# Patient Record
Sex: FEMALE | Race: OTHER | Employment: FULL TIME | URBAN - METROPOLITAN AREA
[De-identification: names, ages, dates, MRNs, and addresses within clinical notes are randomized per-mention and may not be internally consistent; named-entity substitution may affect disease eponyms.]

---

## 2022-09-28 ENCOUNTER — TELEPHONE (OUTPATIENT)
Dept: OTHER | Facility: OTHER | Age: 37
End: 2022-09-28

## 2022-09-28 NOTE — TELEPHONE ENCOUNTER
Patient is requesting a call back, She needs to reschedule her appointment for 10/18/22 to a sooner date since she is running low on her Cymbalta and her Remeron  She on has enough for about 7 days

## 2022-10-01 ENCOUNTER — HOSPITAL ENCOUNTER (EMERGENCY)
Facility: HOSPITAL | Age: 37
End: 2022-10-03
Attending: EMERGENCY MEDICINE | Admitting: EMERGENCY MEDICINE
Payer: COMMERCIAL

## 2022-10-01 ENCOUNTER — APPOINTMENT (EMERGENCY)
Dept: CT IMAGING | Facility: HOSPITAL | Age: 37
End: 2022-10-01
Payer: COMMERCIAL

## 2022-10-01 ENCOUNTER — APPOINTMENT (OUTPATIENT)
Dept: RADIOLOGY | Facility: HOSPITAL | Age: 37
End: 2022-10-01
Payer: COMMERCIAL

## 2022-10-01 DIAGNOSIS — R41.82 ALTERED MENTAL STATUS: ICD-10-CM

## 2022-10-01 DIAGNOSIS — F10.929 ALCOHOL INTOXICATION (HCC): ICD-10-CM

## 2022-10-01 DIAGNOSIS — R45.851 SUICIDAL IDEATION: Primary | ICD-10-CM

## 2022-10-01 LAB
ALBUMIN SERPL BCP-MCNC: 4.1 G/DL (ref 3.5–5)
ALP SERPL-CCNC: 53 U/L (ref 34–104)
ALT SERPL W P-5'-P-CCNC: 11 U/L (ref 7–52)
AMMONIA PLAS-SCNC: 29 UMOL/L (ref 18–72)
AMPHETAMINES SERPL QL SCN: NEGATIVE
ANION GAP SERPL CALCULATED.3IONS-SCNC: 11 MMOL/L (ref 4–13)
APAP SERPL-MCNC: <10 UG/ML (ref 10–20)
AST SERPL W P-5'-P-CCNC: 23 U/L (ref 13–39)
BARBITURATES UR QL: NEGATIVE
BASE EX.OXY STD BLDV CALC-SCNC: 96.4 % (ref 60–80)
BASE EXCESS BLDV CALC-SCNC: 1.4 MMOL/L
BASOPHILS # BLD AUTO: 0.06 THOUSANDS/ΜL (ref 0–0.1)
BASOPHILS NFR BLD AUTO: 1 % (ref 0–1)
BENZODIAZ UR QL: NEGATIVE
BILIRUB DIRECT SERPL-MCNC: 0.02 MG/DL (ref 0–0.2)
BILIRUB SERPL-MCNC: 0.21 MG/DL (ref 0.2–1)
BUN SERPL-MCNC: 12 MG/DL (ref 5–25)
CALCIUM SERPL-MCNC: 8.7 MG/DL (ref 8.4–10.2)
CHLORIDE SERPL-SCNC: 107 MMOL/L (ref 96–108)
CK SERPL-CCNC: 84 U/L (ref 26–192)
CO2 SERPL-SCNC: 29 MMOL/L (ref 21–32)
COCAINE UR QL: NEGATIVE
CREAT SERPL-MCNC: 0.62 MG/DL (ref 0.6–1.3)
EOSINOPHIL # BLD AUTO: 0.04 THOUSAND/ΜL (ref 0–0.61)
EOSINOPHIL NFR BLD AUTO: 1 % (ref 0–6)
ERYTHROCYTE [DISTWIDTH] IN BLOOD BY AUTOMATED COUNT: 13.2 % (ref 11.6–15.1)
ETHANOL EXG-MCNC: 0.09 MG/DL
ETHANOL EXG-MCNC: 0.13 MG/DL
ETHANOL EXG-MCNC: 0.17 MG/DL
ETHANOL EXG-MCNC: 0.21 MG/DL
ETHANOL SERPL-MCNC: 392 MG/DL
GFR SERPL CREATININE-BSD FRML MDRD: 117 ML/MIN/1.73SQ M
GLUCOSE SERPL-MCNC: 94 MG/DL (ref 65–140)
HCG SERPL QL: NEGATIVE
HCO3 BLDV-SCNC: 26.1 MMOL/L (ref 24–30)
HCT VFR BLD AUTO: 35.4 % (ref 34.8–46.1)
HGB BLD-MCNC: 12.3 G/DL (ref 11.5–15.4)
IMM GRANULOCYTES # BLD AUTO: 0.01 THOUSAND/UL (ref 0–0.2)
IMM GRANULOCYTES NFR BLD AUTO: 0 % (ref 0–2)
LYMPHOCYTES # BLD AUTO: 1.85 THOUSANDS/ΜL (ref 0.6–4.47)
LYMPHOCYTES NFR BLD AUTO: 33 % (ref 14–44)
MCH RBC QN AUTO: 32.5 PG (ref 26.8–34.3)
MCHC RBC AUTO-ENTMCNC: 34.7 G/DL (ref 31.4–37.4)
MCV RBC AUTO: 94 FL (ref 82–98)
METHADONE UR QL: NEGATIVE
MONOCYTES # BLD AUTO: 0.33 THOUSAND/ΜL (ref 0.17–1.22)
MONOCYTES NFR BLD AUTO: 6 % (ref 4–12)
NEUTROPHILS # BLD AUTO: 3.27 THOUSANDS/ΜL (ref 1.85–7.62)
NEUTS SEG NFR BLD AUTO: 59 % (ref 43–75)
NRBC BLD AUTO-RTO: 0 /100 WBCS
O2 CT BLDV-SCNC: 17.6 ML/DL
OPIATES UR QL SCN: NEGATIVE
OXYCODONE+OXYMORPHONE UR QL SCN: NEGATIVE
PCO2 BLDV: 41.6 MM HG (ref 42–50)
PCP UR QL: NEGATIVE
PH BLDV: 7.42 [PH] (ref 7.3–7.4)
PLATELET # BLD AUTO: 244 THOUSANDS/UL (ref 149–390)
PMV BLD AUTO: 8.7 FL (ref 8.9–12.7)
PO2 BLDV: 146.8 MM HG (ref 35–45)
POTASSIUM SERPL-SCNC: 4.4 MMOL/L (ref 3.5–5.3)
PROT SERPL-MCNC: 6.9 G/DL (ref 6.4–8.4)
RBC # BLD AUTO: 3.78 MILLION/UL (ref 3.81–5.12)
SALICYLATES SERPL-MCNC: <5 MG/DL (ref 3–20)
SODIUM SERPL-SCNC: 147 MMOL/L (ref 135–147)
THC UR QL: NEGATIVE
WBC # BLD AUTO: 5.56 THOUSAND/UL (ref 4.31–10.16)

## 2022-10-01 PROCEDURE — 85025 COMPLETE CBC W/AUTO DIFF WBC: CPT | Performed by: EMERGENCY MEDICINE

## 2022-10-01 PROCEDURE — 80076 HEPATIC FUNCTION PANEL: CPT | Performed by: EMERGENCY MEDICINE

## 2022-10-01 PROCEDURE — 71045 X-RAY EXAM CHEST 1 VIEW: CPT

## 2022-10-01 PROCEDURE — 82550 ASSAY OF CK (CPK): CPT | Performed by: EMERGENCY MEDICINE

## 2022-10-01 PROCEDURE — 70450 CT HEAD/BRAIN W/O DYE: CPT

## 2022-10-01 PROCEDURE — 80143 DRUG ASSAY ACETAMINOPHEN: CPT | Performed by: EMERGENCY MEDICINE

## 2022-10-01 PROCEDURE — 80179 DRUG ASSAY SALICYLATE: CPT | Performed by: EMERGENCY MEDICINE

## 2022-10-01 PROCEDURE — 36415 COLL VENOUS BLD VENIPUNCTURE: CPT | Performed by: EMERGENCY MEDICINE

## 2022-10-01 PROCEDURE — 99285 EMERGENCY DEPT VISIT HI MDM: CPT | Performed by: EMERGENCY MEDICINE

## 2022-10-01 PROCEDURE — 84703 CHORIONIC GONADOTROPIN ASSAY: CPT | Performed by: EMERGENCY MEDICINE

## 2022-10-01 PROCEDURE — 82140 ASSAY OF AMMONIA: CPT | Performed by: EMERGENCY MEDICINE

## 2022-10-01 PROCEDURE — 80048 BASIC METABOLIC PNL TOTAL CA: CPT | Performed by: EMERGENCY MEDICINE

## 2022-10-01 PROCEDURE — 82075 ASSAY OF BREATH ETHANOL: CPT | Performed by: EMERGENCY MEDICINE

## 2022-10-01 PROCEDURE — 93005 ELECTROCARDIOGRAM TRACING: CPT

## 2022-10-01 PROCEDURE — 99285 EMERGENCY DEPT VISIT HI MDM: CPT

## 2022-10-01 PROCEDURE — 87635 SARS-COV-2 COVID-19 AMP PRB: CPT | Performed by: EMERGENCY MEDICINE

## 2022-10-01 PROCEDURE — 82805 BLOOD GASES W/O2 SATURATION: CPT | Performed by: EMERGENCY MEDICINE

## 2022-10-01 PROCEDURE — 82077 ASSAY SPEC XCP UR&BREATH IA: CPT | Performed by: EMERGENCY MEDICINE

## 2022-10-01 PROCEDURE — 80307 DRUG TEST PRSMV CHEM ANLYZR: CPT | Performed by: EMERGENCY MEDICINE

## 2022-10-01 RX ORDER — MIRTAZAPINE 30 MG/1
30 TABLET, FILM COATED ORAL
COMMUNITY
End: 2022-10-07

## 2022-10-01 RX ORDER — DULOXETIN HYDROCHLORIDE 60 MG/1
60 CAPSULE, DELAYED RELEASE ORAL DAILY
COMMUNITY
End: 2022-10-07

## 2022-10-01 RX ORDER — MIRTAZAPINE 15 MG/1
30 TABLET, FILM COATED ORAL ONCE
Status: COMPLETED | OUTPATIENT
Start: 2022-10-01 | End: 2022-10-01

## 2022-10-01 RX ORDER — NICOTINE 21 MG/24HR
14 PATCH, TRANSDERMAL 24 HOURS TRANSDERMAL ONCE
Status: COMPLETED | OUTPATIENT
Start: 2022-10-01 | End: 2022-10-02

## 2022-10-01 RX ORDER — NALOXONE HYDROCHLORIDE 1 MG/ML
1 INJECTION PARENTERAL ONCE
Status: COMPLETED | OUTPATIENT
Start: 2022-10-01 | End: 2022-10-01

## 2022-10-01 RX ADMIN — MIRTAZAPINE 30 MG: 15 TABLET, FILM COATED ORAL at 21:37

## 2022-10-01 RX ADMIN — NICOTINE 14 MG: 14 PATCH, EXTENDED RELEASE TRANSDERMAL at 21:38

## 2022-10-01 NOTE — ED PROVIDER NOTES
History  Chief Complaint   Patient presents with   • Altered Mental Status     Pt presents to ED via EMS from hotel where pt was found unresponsive on lobby on couch  Pt responses to sternal rub  81-year-old female previous history of alcohol abuse, boderline personality disorder, self harm, PTSD, hypokalemia presents for altered mental status    Patient found dressed nicely, passed out on a hotel couch  Patient initially speaking English to EMS  Arouse to sternal rub only  Following commands when awake  Was given Narcan without relief of symptoms  In emergency department patient denies that she speaks [place]  Patient follows commands when I ask her to do things in [place] but otherwise speaks [place]  And tells me that she does not speak [place]  Unable to find a osbaldo  on our [facility] 139  Review of records reveals the patient spoke English on multiple other visits to multiple other hospitals  Altered Mental Status  Presenting symptoms: disorientation and lethargy    Severity:  Severe  Most recent episode: Today  Episode history:  Single  Timing:  Constant  Progression:  Unchanged  Chronicity:  New  Context: alcohol use        Prior to Admission Medications   Prescriptions Last Dose Informant Patient Reported? Taking? DULoxetine (CYMBALTA) 60 mg delayed release capsule   Yes Yes   Sig: Take 60 mg by mouth daily   mirtazapine (REMERON) 30 mg tablet   Yes Yes   Sig: Take 30 mg by mouth daily at bedtime      Facility-Administered Medications: None       Past Medical History:   Diagnosis Date   • Alcohol abuse    • Anxiety    • Borderline personality disorder ([facility] [address] )    • Depression    • Eating disorder    • PTSD (post-traumatic stress disorder)    • Sleep difficulties        No past surgical history on file  No family history on file  I have reviewed and agree with the history as documented      E-Cigarette/Vaping   • E-Cigarette Use Current Every Day User      E-Cigarette/Vaping Substances     Social History     Vaping Use   • Vaping Use: Every day   Substance Use Topics   • Alcohol use: Yes     Comment: Patient stated social drinking   • Drug use: Never       Review of Systems   Unable to perform ROS: Acuity of condition       Physical Exam  Physical Exam  Vitals and nursing note reviewed  Constitutional:       Appearance: She is ill-appearing  She is not toxic-appearing or diaphoretic  HENT:      Head: Normocephalic and atraumatic  Mouth/Throat:      Mouth: Mucous membranes are moist    Eyes:      General: No scleral icterus  Extraocular Movements: Extraocular movements intact  Right eye: Normal extraocular motion  Left eye: Normal extraocular motion  Pupils: Pupils are equal    Cardiovascular:      Rate and Rhythm: Normal rate  Heart sounds: No murmur heard  Pulmonary:      Effort: No respiratory distress  Breath sounds: No wheezing  Comments: Productive cough    Abdominal:      General: There is no distension  Palpations: There is no mass  Musculoskeletal:         General: Normal range of motion  Cervical back: Neck supple  Skin:     Capillary Refill: Capillary refill takes less than 2 seconds  Coloration: Skin is not cyanotic  Findings: No erythema  Neurological:      GCS: GCS eye subscore is 2  GCS verbal subscore is 2  GCS motor subscore is 6  Cranial Nerves: No cranial nerve deficit or facial asymmetry  Motor: No weakness  Coordination: Coordination normal       Comments: Arouses to sternal rub  Week for a few seconds after sternal rub  Obeys commands regards to motor  Speaking foreign language vs tongues   Follows commands given in [address] Signs  ED Triage Vitals   Temperature Pulse Respirations Blood Pressure SpO2   10/01/22 1154 10/01/22 1152 10/01/22 1152 10/01/22 1152 10/01/22 1152   98 9 °F (37 2 °C) 84 14 111/78 100 %      Temp Source Heart Rate Source Patient Position - Orthostatic VS BP Location FiO2 (%)   10/01/22 1154 10/01/22 1433 10/01/22 1433 10/01/22 1433 --   Oral Monitor Lying Left arm       Pain Score       --                  Vitals:    10/01/22 1439 10/01/22 1557 10/01/22 2051 10/02/22 0605   BP: 114/88 117/85 119/67 107/70   Pulse: 68 68 69 69   Patient Position - Orthostatic VS:  Sitting Sitting Lying         Visual Acuity      ED Medications  Medications   nicotine (NICODERM CQ) 14 mg/24hr TD 24 hr patch 14 mg (14 mg Transdermal Medication Applied 10/1/22 2138)   naloxone (FOR EMS ONLY) [facility]) 2 MG/2ML injection 2 mg (0 mg Does not apply Given to EMS 10/1/22 1257)   mirtazapine (REMERON) tablet 30 mg (30 mg Oral Given 10/1/22 2137)   DULoxetine (CYMBALTA) delayed release capsule 90 mg (90 mg Oral Given 10/2/22 0804)       Diagnostic Studies  Results Reviewed     Procedure Component Value Units Date/Time    COVID only [[id number]]  (Normal) Collected: 10/01/22 2313    Lab Status: Final result Specimen: Nares from Nose Updated: 10/02/22 0023     SARS-CoV-2 Negative    Narrative:      FOR PEDIATRIC PATIENTS - copy/paste COVID Guidelines URL to browser: [email or web address]    SARS-CoV-2 assay is a Nucleic Acid Amplification assay intended for the  qualitative detection of nucleic acid from SARS-CoV-2 in nasopharyngeal  swabs  Results are for the presumptive identification of SARS-CoV-2 RNA  Positive results are indicative of infection with SARS-CoV-2, the virus  causing COVID-19, but do not rule out bacterial infection or co-infection  with other viruses  Laboratories within the [place] and its  territories are required to report all positive results to the appropriate  public health authorities  Negative results do not preclude SARS-CoV-2  infection and should not be used as the sole basis for treatment or other  patient management decisions   Negative results must be combined with  clinical observations, patient history, and epidemiological information  This test has not been FDA cleared or approved  This test has been authorized by FDA under an Emergency Use Authorization  (EUA)  This test is only authorized for the duration of time the  declaration that circumstances exist justifying the authorization of the  emergency use of an in vitro diagnostic tests for detection of SARS-CoV-2  virus and/or diagnosis of COVID-19 infection under section 564(b)(1) of  the Act, 21 U  S C  877WZT-1(O)(5), unless the authorization is terminated  or revoked sooner  The test has been validated but independent review by FDA  and CLIA is pending  Test performed using "[employer] GeneXpert: This RT-PCR assay targets N2,  a region unique to SARS-CoV-2  A conserved region in the E-gene was chosen  for pan-Sarbecovirus detection which includes SARS-CoV-2  According to CMS-2020-01-R, this platform meets the definition of high-throughput technology  POCT alcohol breath test [[id number]]  (Normal) Resulted: 10/01/22 2217    Lab Status: Final result Updated: 10/01/22 2217     EXTBreath Alcohol 0 087    POCT alcohol breath test [[id number]]  (Normal) Resulted: 10/01/22 2001    Lab Status: Final result Updated: 10/01/22 2001     EXTBreath Alcohol 0 126    POCT alcohol breath test [[id number]]  (Normal) Resulted: 10/01/22 1824    Lab Status: Final result Updated: 10/01/22 1825     EXTBreath Alcohol 0 167    Rapid drug screen, urine [[id number]]  (Normal) Collected: 10/01/22 1558    Lab Status: Final result Specimen: Urine, Clean Catch Updated: 10/01/22 1704     Amph/Meth UR Negative     Barbiturate Ur Negative     Benzodiazepine Urine Negative     Cocaine Urine Negative     Methadone Urine Negative     Opiate Urine Negative     PCP Ur Negative     THC Urine Negative     Oxycodone Urine Negative    Narrative:      FOR MEDICAL PURPOSES ONLY  IF CONFIRMATION NEEDED PLEASE CONTACT THE LAB WITHIN 5 DAYS      Drug Screen Cutoff Levels:  AMPHETAMINE/METHAMPHETAMINES  1000 ng/mL  BARBITURATES     200 ng/mL  BENZODIAZEPINES     200 ng/mL  COCAINE      300 ng/mL  METHADONE      300 ng/mL  OPIATES      300 ng/mL  PHENCYCLIDINE     25 ng/mL  THC       50 ng/mL  OXYCODONE      100 ng/mL    POCT alcohol breath test [[id number]]  (Abnormal) Resulted: 10/01/22 1653    Lab Status: Edited Result - FINAL Updated: 10/01/22 1654     EXTBreath Alcohol 0 207    hCG, qualitative pregnancy [[id number]]  (Normal) Collected: 10/01/22 1204    Lab Status: Final result Specimen: Blood from Arm, Left Updated: 10/01/22 1312     Preg, Serum Negative    Hepatic function panel [[id number]]  (Normal) Collected: 10/01/22 1204    Lab Status: Final result Specimen: Blood from Arm, Left Updated: 10/01/22 1255     Total Bilirubin 0 21 mg/dL      Bilirubin, Direct 0 02 mg/dL      Alkaline Phosphatase 53 U/L      AST 23 U/L      ALT 11 U/L      Total Protein 6 9 g/dL      Albumin 4 1 g/dL     CK (with reflex to MB) [[id number]]  (Normal) Collected: 10/01/22 1204    Lab Status: Final result Specimen: Blood from Arm, Left Updated: 10/01/22 1255     Total CK 84 U/L     Basic metabolic panel [[id number]] Collected: 10/01/22 1204    Lab Status: Final result Specimen: Blood from Arm, Left Updated: 10/01/22 1255     Sodium 147 mmol/L      Potassium 4 4 mmol/L      Chloride 107 mmol/L      CO2 29 mmol/L      ANION GAP 11 mmol/L      BUN 12 mg/dL      Creatinine 0 62 mg/dL      Glucose 94 mg/dL      Calcium 8 7 mg/dL      eGFR 117 ml/min/1 73sq m     Narrative:      [facility] guidelines for Chronic Kidney Disease (CKD):   •  Stage 1 with normal or high GFR (GFR > 90 mL/min/1 73 square meters)  •  Stage 2 Mild CKD (GFR = 60-89 mL/min/1 73 square meters)  •  Stage 3A Moderate CKD (GFR = 45-59 mL/min/1 73 square meters)  •  Stage 3B Moderate CKD (GFR = 30-44 mL/min/1 73 square meters)  •  Stage 4 Severe CKD (GFR = 15-29 mL/min/1 73 square meters)  •  Stage 5 End Stage CKD (GFR <15 mL/min/1 73 square meters)  Note: GFR calculation is accurate only with a steady state creatinine    Salicylate level [[id number]]  (Normal) Collected: 10/01/22 1204    Lab Status: Final result Specimen: Blood from Arm, Left Updated: 31/45/15 1299     Salicylate Lvl <5 mg/dL     Acetaminophen level-If concentration is detectable, please discuss with medical  on call   [[id number]]  (Abnormal) Collected: 10/01/22 1204    Lab Status: Final result Specimen: Blood from Arm, Left Updated: 10/01/22 1251     Acetaminophen Level <10 ug/mL     Ethanol [[id number]]  (Abnormal) Collected: 10/01/22 1204    Lab Status: Final result Specimen: Blood from Arm, Left Updated: 10/01/22 1250     Ethanol Lvl 392 mg/dL     Ammonia [[id number]]  (Normal) Collected: 10/01/22 1204    Lab Status: Final result Specimen: Blood from Arm, Left Updated: 10/01/22 1250     Ammonia 29 umol/L     Blood gas, venous [[id number]]  (Abnormal) Collected: 10/01/22 1204    Lab Status: Final result Specimen: Blood from Arm, Left Updated: 10/01/22 1239     pH, Yao 7 416     pCO2, Yao 41 6 mm Hg      pO2, Yao 146 8 mm Hg      HCO3, Yao 26 1 mmol/L      Base Excess, Yao 1 4 mmol/L      O2 Content, Yao 17 6 ml/dL      O2 HGB, VENOUS 96 4 %     CBC and differential [[id number]]  (Abnormal) Collected: 10/01/22 1204    Lab Status: Final result Specimen: Blood from Arm, Left Updated: 10/01/22 1216     WBC 5 56 Thousand/uL      RBC 3 78 Million/uL      Hemoglobin 12 3 g/dL      Hematocrit 35 4 %      MCV 94 fL      MCH 32 5 pg      MCHC 34 7 g/dL      RDW 13 2 %      MPV 8 7 fL      Platelets 299 Thousands/uL      nRBC 0 /100 WBCs      Neutrophils Relative 59 %      Immat GRANS % 0 %      Lymphocytes Relative 33 %      Monocytes Relative 6 %      Eosinophils Relative 1 %      Basophils Relative 1 %      Neutrophils Absolute 3 27 Thousands/µL      Immature Grans Absolute 0 01 Thousand/uL      Lymphocytes Absolute 1 85 Thousands/µL      Monocytes Absolute 0 33 Thousand/µL      Eosinophils Absolute 0 04 Thousand/µL      Basophils Absolute 0 06 Thousands/µL                  XR chest 1 view portable   Final Result by Niecy Santamaria MD (10/01 1338)      No acute cardiopulmonary disease  Workstation performed: [id number]         CT head without contrast   Final Result by Margaret Torre MD (10/01 1314)      No acute intracranial abnormality  Workstation performed: [id number]                    Procedures  Procedures         ED Course  ED Course as of 10/02/22 0824   Sat Oct 01, 2022   1319 Procedure Note: EKG  Date/Time: 10/01/22 1:19 PM   Interpreted by: Payton Hashimoto  Indications / Diagnosis: AMS  ECG reviewed by me, the ED Provider: yes   The EKG demonstrates:  Rhythm: normal sinus  Intervals: normal intervals  Axis: normal axis  QRS/Blocks: normal QRS  ST Changes: No acute ST Changes, no STD/DEEPA      1401 Patient is now speaking [place]  Requesting to remove the IV     1509 MEDICAL ALCOHOL(!): 392   1614 Patient is ambulating without problem   1640 Patient continues to speak english normally  Has ate a full meal  Drinking soda  1728 EXTBreath Alcohol: 0 207  Should be sober by 10pm   1740 Patient not interested in ETOH rehab    1852 Pt now asking to see crisis  Will have crisis eval once sober  Kyle Murciahazel Oct 02, 2022   0262 Suicidal 201  Bed Search                               SBIRT 22yo+    Flowsheet Row Most Recent Value   SBIRT (23 yo +)    In order to provide better care to our patients, we are screening all of our patients for alcohol and drug use  Would it be okay to ask you these screening questions? No Filed at: 10/01/2022 2244                    MDM  Number of Diagnoses or Management Options  Alcohol intoxication ([facility] [address] ): new and requires workup  Altered mental status: new and requires workup  Suicidal ideation: new and requires workup  Diagnosis management comments: Patient presents with altered mental status    GCS 10  Wakes to sternal rub  No reason for intubation at this point  Patient given naloxone without good effect  Will evaluate for intracranial bleed, intoxicants, hyper am anemia, hypercapnia  Workup positive for high ethanol level  Otherwise no significant findings  Patient had resolution of her symptoms and started speaking [place]  Will await sobriety versus patient getting a ride  Patient declined admission to alcohol detox  Patient request evaluation by crisis worker  Patient signed out to evening doctor pending crisis evaluation  Amount and/or Complexity of Data Reviewed  Clinical lab tests: ordered and reviewed  Tests in the radiology section of CPT®: ordered and reviewed  Review and summarize past medical records: yes  Independent visualization of images, tracings, or specimens: yes    Risk of Complications, Morbidity, and/or Mortality  Presenting problems: moderate  Diagnostic procedures: moderate  Management options: moderate        Disposition  Final diagnoses:   Alcohol intoxication ([facility] )   Altered mental status   Suicidal ideation     Time reflects when diagnosis was documented in both MDM as applicable and the Disposition within this note     Time User Action Codes Description Comment    10/1/2022  4:44 PM Caridad Montiel [F10 929] Alcohol intoxication ([facility] )     10/1/2022  4:44 PM Falguni James Add [R41 82] Altered mental status     10/1/2022 11:04 PM Levester Mimes Add [R45 851] Suicidal ideation     10/1/2022 11:04 PM Levester Mimes Modify [F10 929] [facility] ([facility] )     10/1/2022 11:04 PM Levester Mimes Modify [A16 454] Suicidal ideation       ED Disposition     ED Disposition   Transfer to [address] [place] [address]   --    Date/Time   Sat Oct 1, 2022 11:05 PM    Comment   Loramaddie Yamel Neto Fried should be transferred to behavioral health, and has been medically cleared             MD Jessica Bell MD Follow-up Information     Follow up With Specialties Details Why Contact Info Additional Information    Your primary doctor  Call in 1 day       [place] Call  As needed [address] [address]  [address] , [place], [place], [place], [id number] [phone]          Patient's Medications   Discharge Prescriptions    No medications on file       No discharge procedures on file      [address] Review     None          ED Provider  Electronically Signed by           Papito Buenrostro DO  10/02/22 4939

## 2022-10-01 NOTE — ED NOTES
Pt " I would like some more soda and I want to 302 myself" Pt was asked if they know the difference between 302 and 201  Pt "I know the difference that is why I want to be 302 " Pt educated on the differenced and informed that they could sign themselves in as a 201 after speaking with Crisis  Asked pt if they are having an thoughts of hurting or killing themselves, pt "Like, not right now " Pt denies auditory or visual hallucinations  Pt encouraged to increase fluids and will be tested again to make sure pt is below the legal limit prior to speaking with Crisis  Attending aware         Leanna Ram RN  10/01/22 8266

## 2022-10-01 NOTE — ED NOTES
Male visitor at the bedside speaking with the pt at this time   Pt overheard speaking Aramis Ramírez RN  10/01/22 3563

## 2022-10-01 NOTE — ED NOTES
Pt ambulating to the bathroom without issues or complications      Mikayla Albrecht, RN  10/01/22 1073

## 2022-10-01 NOTE — ED NOTES
Pt requesting to have another breathalyzer  PT still not able to speak with Crisis  Pt verbalizing frustration        Sharyle Beans, RN  10/01/22 3147

## 2022-10-02 VITALS
DIASTOLIC BLOOD PRESSURE: 77 MMHG | HEART RATE: 53 BPM | TEMPERATURE: 97.5 F | OXYGEN SATURATION: 100 % | RESPIRATION RATE: 15 BRPM | SYSTOLIC BLOOD PRESSURE: 112 MMHG | WEIGHT: 103.4 LBS

## 2022-10-02 LAB
SARS-COV-2 RNA RESP QL NAA+PROBE: NEGATIVE
SARS-COV-2 RNA RESP QL NAA+PROBE: NEGATIVE

## 2022-10-02 PROCEDURE — 99242 OFF/OP CONSLTJ NEW/EST SF 20: CPT | Performed by: PSYCHIATRY & NEUROLOGY

## 2022-10-02 PROCEDURE — 87635 SARS-COV-2 COVID-19 AMP PRB: CPT | Performed by: EMERGENCY MEDICINE

## 2022-10-02 RX ORDER — NICOTINE 21 MG/24HR
14 PATCH, TRANSDERMAL 24 HOURS TRANSDERMAL DAILY
Status: DISCONTINUED | OUTPATIENT
Start: 2022-10-03 | End: 2022-10-03 | Stop reason: HOSPADM

## 2022-10-02 RX ORDER — HALOPERIDOL 5 MG/ML
5 INJECTION INTRAMUSCULAR
Status: CANCELLED | OUTPATIENT
Start: 2022-10-02

## 2022-10-02 RX ORDER — MIRTAZAPINE 15 MG/1
30 TABLET, FILM COATED ORAL ONCE
Status: CANCELLED | OUTPATIENT
Start: 2022-10-02 | End: 2022-10-02

## 2022-10-02 RX ORDER — MIRTAZAPINE 15 MG/1
30 TABLET, FILM COATED ORAL
Status: DISCONTINUED | OUTPATIENT
Start: 2022-10-02 | End: 2022-10-03 | Stop reason: HOSPADM

## 2022-10-02 RX ORDER — LANOLIN ALCOHOL/MO/W.PET/CERES
3 CREAM (GRAM) TOPICAL ONCE
Status: COMPLETED | OUTPATIENT
Start: 2022-10-02 | End: 2022-10-02

## 2022-10-02 RX ORDER — LANOLIN ALCOHOL/MO/W.PET/CERES
100 CREAM (GRAM) TOPICAL DAILY
Status: CANCELLED | OUTPATIENT
Start: 2022-10-02

## 2022-10-02 RX ORDER — MAGNESIUM HYDROXIDE/ALUMINUM HYDROXICE/SIMETHICONE 120; 1200; 1200 MG/30ML; MG/30ML; MG/30ML
30 SUSPENSION ORAL EVERY 4 HOURS PRN
Status: CANCELLED | OUTPATIENT
Start: 2022-10-02

## 2022-10-02 RX ORDER — HYDROXYZINE HYDROCHLORIDE 25 MG/1
25 TABLET, FILM COATED ORAL
Status: CANCELLED | OUTPATIENT
Start: 2022-10-02

## 2022-10-02 RX ORDER — POLYETHYLENE GLYCOL 3350 17 G/17G
17 POWDER, FOR SOLUTION ORAL DAILY PRN
Status: CANCELLED | OUTPATIENT
Start: 2022-10-02

## 2022-10-02 RX ORDER — FOLIC ACID 1 MG/1
1 TABLET ORAL DAILY
Status: CANCELLED | OUTPATIENT
Start: 2022-10-02

## 2022-10-02 RX ORDER — HALOPERIDOL 5 MG/1
5 TABLET ORAL
Status: CANCELLED | OUTPATIENT
Start: 2022-10-02

## 2022-10-02 RX ORDER — NICOTINE 21 MG/24HR
14 PATCH, TRANSDERMAL 24 HOURS TRANSDERMAL ONCE
Status: DISCONTINUED | OUTPATIENT
Start: 2022-10-02 | End: 2022-10-03 | Stop reason: HOSPADM

## 2022-10-02 RX ORDER — BENZTROPINE MESYLATE 1 MG/ML
0.5 INJECTION INTRAMUSCULAR; INTRAVENOUS
Status: CANCELLED | OUTPATIENT
Start: 2022-10-02

## 2022-10-02 RX ORDER — HALOPERIDOL 1 MG/1
2 TABLET ORAL
Status: CANCELLED | OUTPATIENT
Start: 2022-10-02

## 2022-10-02 RX ORDER — GABAPENTIN 100 MG/1
100 CAPSULE ORAL 3 TIMES DAILY
Status: CANCELLED | OUTPATIENT
Start: 2022-10-02

## 2022-10-02 RX ORDER — ACETAMINOPHEN 325 MG/1
975 TABLET ORAL EVERY 6 HOURS PRN
Status: CANCELLED | OUTPATIENT
Start: 2022-10-02

## 2022-10-02 RX ORDER — ACETAMINOPHEN 325 MG/1
650 TABLET ORAL EVERY 4 HOURS PRN
Status: CANCELLED | OUTPATIENT
Start: 2022-10-02

## 2022-10-02 RX ORDER — BENZTROPINE MESYLATE 1 MG/1
1 TABLET ORAL EVERY 6 HOURS PRN
Status: CANCELLED | OUTPATIENT
Start: 2022-10-02

## 2022-10-02 RX ORDER — ACETAMINOPHEN 325 MG/1
650 TABLET ORAL EVERY 6 HOURS PRN
Status: CANCELLED | OUTPATIENT
Start: 2022-10-02

## 2022-10-02 RX ORDER — DULOXETIN HYDROCHLORIDE 60 MG/1
60 CAPSULE, DELAYED RELEASE ORAL ONCE
Status: CANCELLED | OUTPATIENT
Start: 2022-10-03

## 2022-10-02 RX ORDER — HALOPERIDOL 5 MG/ML
2.5 INJECTION INTRAMUSCULAR
Status: CANCELLED | OUTPATIENT
Start: 2022-10-02

## 2022-10-02 RX ORDER — LORAZEPAM 2 MG/ML
2 INJECTION INTRAMUSCULAR
Status: CANCELLED | OUTPATIENT
Start: 2022-10-02

## 2022-10-02 RX ORDER — TRAZODONE HYDROCHLORIDE 100 MG/1
100 TABLET ORAL
Status: CANCELLED | OUTPATIENT
Start: 2022-10-02

## 2022-10-02 RX ORDER — BENZTROPINE MESYLATE 1 MG/ML
1 INJECTION INTRAMUSCULAR; INTRAVENOUS
Status: CANCELLED | OUTPATIENT
Start: 2022-10-02

## 2022-10-02 RX ORDER — LORAZEPAM 1 MG/1
1 TABLET ORAL
Status: CANCELLED | OUTPATIENT
Start: 2022-10-02

## 2022-10-02 RX ORDER — LORAZEPAM 2 MG/ML
1 INJECTION INTRAMUSCULAR
Status: CANCELLED | OUTPATIENT
Start: 2022-10-02

## 2022-10-02 RX ORDER — HYDROXYZINE HYDROCHLORIDE 25 MG/1
50 TABLET, FILM COATED ORAL
Status: CANCELLED | OUTPATIENT
Start: 2022-10-02

## 2022-10-02 RX ORDER — LORAZEPAM 2 MG/ML
1 INJECTION INTRAMUSCULAR EVERY 4 HOURS PRN
Status: CANCELLED | OUTPATIENT
Start: 2022-10-02

## 2022-10-02 RX ADMIN — MIRTAZAPINE 30 MG: 15 TABLET, FILM COATED ORAL at 20:39

## 2022-10-02 RX ADMIN — DULOXETINE HYDROCHLORIDE 90 MG: 60 CAPSULE, DELAYED RELEASE ORAL at 08:04

## 2022-10-02 RX ADMIN — NICOTINE 14 MG: 14 PATCH, EXTENDED RELEASE TRANSDERMAL at 18:11

## 2022-10-02 RX ADMIN — Medication 3 MG: at 20:39

## 2022-10-02 NOTE — ED NOTES
Intake is advising COVID re-test no later than 3 hours prior to scheduled pick-up so as not to delay transfer  Please order / collect COVID re-test no later than 0700 to allow for 1000 transfer on 10/3/22

## 2022-10-02 NOTE — ED NOTES
Patient is accepted at [facility]  Patient is accepted by Arabella Toscano on behalf of Dr Faustino So per [facility]  Transportation is arranged with [employer]  Transportation is scheduled for 1000 on Monday 10/3/22 (per Ivelisse Rollins, there is no transport available today)  Patient may go to the floor anytime before 1430 or after 1530, excluding arrival between 1830 and 1930  Intake is aware  EMTALA initiated  To be printed and signed by patient and ED physician prior to transfer (will delay printing in case there is a change in transportation arrangements)  Patient will require an updated COVID test either later tonight or early tomorrow morning, as results will need to be available within 24 hours or arrival, but before patient is transferred  Nurse report is to be called to [phone] prior to patient transfer

## 2022-10-02 NOTE — ED NOTES
Referrals were faxed to [facility] (per Sergio Hughes), [facility] (per Yoseph Ariza), and [facility] (per Trupti)  Intake then advised of bed availability in network  Calls to reviewing facilities to advise bed is no longer needed  Awaiting orders

## 2022-10-02 NOTE — ED NOTES
Crisis worker called to complete pre-certification of benefits, client is active with [address] services  Per Rita Yarbrough, accepting facility will need to call for authorization as it is not provided over the phone  Call must be placed to member services, and UR contact will be provided with the authorization number as well  Stable without recent exacerbation  Continue albuterol inhaler as needed

## 2022-10-02 NOTE — ED NOTES
Pt informed that all personal belongings will be locked up  Pt upset that they can not keep their coat, informed them that it is hospital policy and blankets will be provided        Paulino Sandoval RN  10/01/22 1451

## 2022-10-02 NOTE — ED NOTES
Insurance Authorization for admission:   Phone call placed to [employer]  Phone number: [phone]  Spoke to Joel Blakely  5 days approved  Level of care: 201  Review on 10/06/2022  Authorization # Available upon admission  EVS (Eligibility Verification System) called - [phone]  Automated system indicates:  Active

## 2022-10-02 NOTE — ED CARE HANDOFF
Emergency Department Sign Out Note        Signout and transfer of care from my colleague, Dr Kayla Brunner  See Separate Emergency Department note  The patient, Isma Armando, was evaluated for altered mental status/alcohol intoxication      Labs Reviewed   CBC AND DIFFERENTIAL - Abnormal       Result Value Ref Range Status    WBC 5 56  4 31 - 10 16 Thousand/uL Final    RBC 3 78 (*) 3 81 - 5 12 Million/uL Final    Hemoglobin 12 3  11 5 - 15 4 g/dL Final    Hematocrit 35 4  34 8 - 46 1 % Final    MCV 94  82 - 98 fL Final    MCH 32 5  26 8 - 34 3 pg Final    MCHC 34 7  31 4 - 37 4 g/dL Final    RDW 13 2  11 6 - 15 1 % Final    MPV 8 7 (*) 8 9 - 12 7 fL Final    Platelets 045  660 - 390 Thousands/uL Final    nRBC 0  /100 WBCs Final    Neutrophils Relative 59  43 - 75 % Final    Immat GRANS % 0  0 - 2 % Final    Lymphocytes Relative 33  14 - 44 % Final    Monocytes Relative 6  4 - 12 % Final    Eosinophils Relative 1  0 - 6 % Final    Basophils Relative 1  0 - 1 % Final    Neutrophils Absolute 3 27  1 85 - 7 62 Thousands/µL Final    Immature Grans Absolute 0 01  0 00 - 0 20 Thousand/uL Final    Lymphocytes Absolute 1 85  0 60 - 4 47 Thousands/µL Final    Monocytes Absolute 0 33  0 17 - 1 22 Thousand/µL Final    Eosinophils Absolute 0 04  0 00 - 0 61 Thousand/µL Final    Basophils Absolute 0 06  0 00 - 0 10 Thousands/µL Final   BLOOD GAS, VENOUS - Abnormal    pH, Yao 7 416 (*) 7 300 - 7 400 Final    pCO2, Yao 41 6 (*) 42 0 - 50 0 mm Hg Final    pO2, Yao 146 8 (*) 35 0 - 45 0 mm Hg Final    HCO3, Yao 26 1  24 - 30 mmol/L Final    Base Excess, Yao 1 4  mmol/L Final    O2 Content, Yao 17 6  ml/dL Final    O2 HGB, VENOUS 96 4 (*) 60 0 - 80 0 % Final   MEDICAL ALCOHOL - Abnormal    Ethanol Lvl 392 (*) <10 mg/dL Final   ACETAMINOPHEN LEVEL - Abnormal    Acetaminophen Level <10 (*) 10 - 20 ug/mL Final   POCT ALCOHOL BREATH TEST - Abnormal    EXTBreath Alcohol 0 207   Corrected   NOVEL CORONAVIRUS (COVID-19), PCR [facility] - Normal SARS-CoV-2 Negative  Negative Final    Narrative:     FOR PEDIATRIC PATIENTS - copy/paste COVID Guidelines URL to browser: [email or web address]  ashx    SARS-CoV-2 assay is a Nucleic Acid Amplification assay intended for the  qualitative detection of nucleic acid from SARS-CoV-2 in nasopharyngeal  swabs  Results are for the presumptive identification of SARS-CoV-2 RNA  Positive results are indicative of infection with SARS-CoV-2, the virus  causing COVID-19, but do not rule out bacterial infection or co-infection  with other viruses  Laboratories within the [place] and its  territories are required to report all positive results to the appropriate  public health authorities  Negative results do not preclude SARS-CoV-2  infection and should not be used as the sole basis for treatment or other  patient management decisions  Negative results must be combined with  clinical observations, patient history, and epidemiological information  This test has not been FDA cleared or approved  This test has been authorized by FDA under an Emergency Use Authorization  (EUA)  This test is only authorized for the duration of time the  declaration that circumstances exist justifying the authorization of the  emergency use of an in vitro diagnostic tests for detection of SARS-CoV-2  virus and/or diagnosis of COVID-19 infection under section 564(b)(1) of  the Act, 21 U  S C  210SGH-5(G)(5), unless the authorization is terminated  or revoked sooner  The test has been validated but independent review by FDA  and CLIA is pending  Test performed using [employer] GeneXpert: This RT-PCR assay targets N2,  a region unique to SARS-CoV-2  A conserved region in the E-gene was chosen  for pan-Sarbecovirus detection which includes SARS-CoV-2  According to CMS-2020-01-R, this platform meets the definition of high-Golgi technology     HEPATIC FUNCTION PANEL - Normal    Total Bilirubin 0 21  0 20 - 1 00 mg/dL Final    Bilirubin, Direct 0 02  0 00 - 0 20 mg/dL Final    Alkaline Phosphatase 53  34 - 104 U/L Final    AST 23  13 - 39 U/L Final    Comment: Specimen collection should occur prior to Sulfasalazine administration due to the potential for falsely depressed results  ALT 11  7 - 52 U/L Final    Comment: Specimen collection should occur prior to Sulfasalazine administration due to the potential for falsely depressed results  Total Protein 6 9  6 4 - 8 4 g/dL Final    Albumin 4 1  3 5 - 5 0 g/dL Final   CK - Normal    Total CK 84  26 - 192 U/L Final   AMMONIA - Normal    Ammonia 29  18 - 72 umol/L Final    Comment: Specimen collection should occur prior to Sulfapyridine administration due to the potential for falsely depressed results  RAPID DRUG SCREEN, URINE - Normal    Amph/Meth UR Negative  Negative Final    Barbiturate Ur Negative  Negative Final    Benzodiazepine Urine Negative  Negative Final    Cocaine Urine Negative  Negative Final    Methadone Urine Negative  Negative Final    Opiate Urine Negative  Negative Final    PCP Ur Negative  Negative Final    THC Urine Negative  Negative Final    Oxycodone Urine Negative  Negative Final    Narrative:     FOR MEDICAL PURPOSES ONLY  IF CONFIRMATION NEEDED PLEASE CONTACT THE LAB WITHIN 5 DAYS      Drug Screen Cutoff Levels:  AMPHETAMINE/METHAMPHETAMINES  1000 ng/mL  BARBITURATES     200 ng/mL  BENZODIAZEPINES     200 ng/mL  COCAINE      300 ng/mL  METHADONE      300 ng/mL  OPIATES      300 ng/mL  PHENCYCLIDINE     25 ng/mL  THC       50 ng/mL  OXYCODONE      909 ng/mL   SALICYLATE LEVEL - Normal    Salicylate Lvl <5  3 - 20 mg/dL Final   PREGNANCY TEST (HCG QUALITATIVE) - Normal    Preg, Serum Negative  Negative Final   POCT ALCOHOL BREATH TEST - Normal    EXTBreath Alcohol 0 167   Final   POCT ALCOHOL BREATH TEST - Normal    EXTBreath Alcohol 0 126   Final   POCT ALCOHOL BREATH TEST - Normal    EXTBreath Alcohol 0 087 Final   BASIC METABOLIC PANEL    Sodium 139  135 - 147 mmol/L Final    Potassium 4 4  3 5 - 5 3 mmol/L Final    Chloride 107  96 - 108 mmol/L Final    CO2 29  21 - 32 mmol/L Final    ANION GAP 11  4 - 13 mmol/L Final    BUN 12  5 - 25 mg/dL Final    Creatinine 0 62  0 60 - 1 30 mg/dL Final    Comment: Standardized to IDMS reference method    Glucose 94  65 - 140 mg/dL Final    Comment: If the patient is fasting, the ADA then defines impaired fasting glucose as > 100 mg/dL and diabetes as > or equal to 123 mg/dL  Specimen collection should occur prior to Sulfasalazine administration due to the potential for falsely depressed results  Specimen collection should occur prior to Sulfapyridine administration due to the potential for falsely elevated results  Calcium 8 7  8 4 - 10 2 mg/dL Final    eGFR 117  ml/min/1 73sq m Final    Narrative:     [place] guidelines for Chronic Kidney Disease (CKD):   •  Stage 1 with normal or high GFR (GFR > 90 mL/min/1 73 square meters)  •  Stage 2 Mild CKD (GFR = 60-89 mL/min/1 73 square meters)  •  Stage 3A Moderate CKD (GFR = 45-59 mL/min/1 73 square meters)  •  Stage 3B Moderate CKD (GFR = 30-44 mL/min/1 73 square meters)  •  Stage 4 Severe CKD (GFR = 15-29 mL/min/1 73 square meters)  •  Stage 5 End Stage CKD (GFR <15 mL/min/1 73 square meters)  Note: GFR calculation is accurate only with a steady state creatinine   COMA PANEL    Narrative: The following orders were created for panel order Coma Panel  Procedure                               Abnormality         Status                     ---------                               -----------         ------                     Ethanol[[id number]]                      Abnormal            Final result               Salicylate RGQWA[[id number]]             Normal              Final result               Acetaminophen level-If c  Vladimir Jurist Vladimir Mott [[id number]]  Abnormal            Final result                 Please view results for these tests on the individual orders  Patient is medically cleared for evaluation by crisis team     Patient was seen and evaluated by crisis team, and complained of suicidal ideation  Patient was placed on a safety watch in the emergency department  Patient was placed on bed search(201), with plan for psychiatric admission  COVID-19 test was ordered in the emergency department  Patient is to be signed out to my colleague at change of shift, on bed search for psychiatric admission  Procedures  MDM        Disposition  Final diagnoses:   Alcohol intoxication ([facility] [address] )   Altered mental status   Suicidal ideation     Time reflects when diagnosis was documented in both MDM as applicable and the Disposition within this note     Time User Action Codes Description Comment    10/1/2022  4:44 PM Yonathan Ophiem [F10 929] Alcohol intoxication ([facility] [address] )     10/1/2022  4:44 PM Sage Jack Add [R41 82] Altered mental status     10/1/2022 11:04 PM Tess Poag Add [R45 851] Suicidal ideation     10/1/2022 11:04 PM Tess Poag Modify [F10 929] Alcohol intoxication ([facility] [address] )     10/1/2022 11:04 PM Tess Poag Modify [M32 351] Suicidal ideation       ED Disposition     ED Disposition   Transfer to [address] [place] [address]   --    Date/Time   Sat Oct 1, 2022 11:05 PM    Comment   Alee Six June Ortega should be transferred to behavioral health, and has been medically cleared             MD Documentation    [address] Most Recent Value   Sending MD Dr Aleena Alvarado MD      Follow-up Information     Follow up With Specialties Details Why Contact Info Additional Information    Your primary doctor  Call in 1 day       [place] Call  As needed [address] [id number]  [id number] , [place], [place], [address]        Patient's Medications   Discharge Prescriptions    No medications on file     No discharge procedures on file         ED Provider  Electronically Signed by     Marcos Kaur MD  10/01/22 2017       Marcos Kaur MD  10/01/22 8455       Marcos Kaur MD  10/02/22 6580

## 2022-10-02 NOTE — ED NOTES
Pt presented to the ED today intoxicated  When sober, patient requested to speak with CW  Pt stated that she had thoughts of self harm this past week with a plan, but did not disclose the plan to cw  Pt stated that she felt safe in the ED and was not currently having thoughts of SI at this time  Pt denied HI, voices or visions  Pt stated that she does not have anyone to live with anymore, and she does not have any stable housing  Pt stated that she has no mental health treatment currently and is only sometimes compliant with her medication  Pt stated that she only sleeps 6 hours with medication  Pt stated that she is losing weight  Pt stated that she has chronic mental health issues, and as she stated, "I have everything in the DSM "  When asked more details about this statement, patient detailed a list of being diagnosed, PTSD, MDD, DID, Anorexia, and boarderline personality disorder  Pt stated that she usually is not intoxicated, and denied any substance abuse  Pt admitted that she vapes daily nicotine  Pt is willing to sign to come into the Hospital for mental health treatment

## 2022-10-02 NOTE — ED NOTES
[facility]  [facility]  [facility]- [facility] beds  [facility]- no beds  [facility]-  Spoke to Wilver @ answering service  Not admissions

## 2022-10-02 NOTE — ED NOTES
Pt signed 201, and lina is on the paper chart at the Nurse's station  Copy in Cw office  Faxed 201 to [facility] intake for next available bed  Bed search needed and pre-cert

## 2022-10-02 NOTE — ED NOTES
Pt requesting new nicotine patch as last one fell off in shower, provider made aware     Tanvi Ceron, HEATHER  10/02/22 4672

## 2022-10-02 NOTE — ED NOTES
Received pt in treatment room AOX4  Comfortably lying in stretcher in no distress  No complaint offered        Temi Garrett RN  10/02/22 1925

## 2022-10-02 NOTE — ED NOTES
PT ambulating to the bathroom with steady gait, denies dizziness       Iveth Mosley RN  10/02/22 1943

## 2022-10-02 NOTE — ED NOTES
[facility] Patient Belongings Charted [phone] 10/2/2022     Akanksha Andres  10/02/22 1204

## 2022-10-02 NOTE — ED NOTES
Jackson Cullen [facility] called to inquire about patient's bed search status, updated insurance information regarding acceptance with [facility], and expected arrival date

## 2022-10-02 NOTE — ED NOTES
Patient escorted with hospital staff to behavioral health shower to perform ADLs     Amberly Jacobson RN  10/02/22 4614

## 2022-10-03 ENCOUNTER — HOSPITAL ENCOUNTER (INPATIENT)
Facility: HOSPITAL | Age: 37
LOS: 4 days | DRG: 885 | End: 2022-10-07
Attending: HOSPITALIST | Admitting: PSYCHIATRY & NEUROLOGY
Payer: COMMERCIAL

## 2022-10-03 DIAGNOSIS — G47.00 INSOMNIA: ICD-10-CM

## 2022-10-03 DIAGNOSIS — F50.9 EATING DISORDER: Primary | ICD-10-CM

## 2022-10-03 DIAGNOSIS — R41.82 ALTERED MENTAL STATUS: ICD-10-CM

## 2022-10-03 DIAGNOSIS — F43.10 PTSD (POST-TRAUMATIC STRESS DISORDER): ICD-10-CM

## 2022-10-03 DIAGNOSIS — F33.2 SEVERE EPISODE OF RECURRENT MAJOR DEPRESSIVE DISORDER, WITHOUT PSYCHOTIC FEATURES (HCC): Chronic | ICD-10-CM

## 2022-10-03 DIAGNOSIS — F10.929 ALCOHOL INTOXICATION (HCC): ICD-10-CM

## 2022-10-03 LAB
ATRIAL RATE: 72 BPM
P AXIS: 60 DEGREES
PR INTERVAL: 153 MS
QRS AXIS: 29 DEGREES
QRSD INTERVAL: 88 MS
QT INTERVAL: 399 MS
QTC INTERVAL: 437 MS
T WAVE AXIS: 53 DEGREES
VENTRICULAR RATE: 72 BPM

## 2022-10-03 PROCEDURE — 99221 1ST HOSP IP/OBS SF/LOW 40: CPT | Performed by: HOSPITALIST

## 2022-10-03 PROCEDURE — 93010 ELECTROCARDIOGRAM REPORT: CPT | Performed by: INTERNAL MEDICINE

## 2022-10-03 PROCEDURE — 93005 ELECTROCARDIOGRAM TRACING: CPT

## 2022-10-03 RX ORDER — ACETAMINOPHEN 325 MG/1
650 TABLET ORAL EVERY 6 HOURS PRN
Status: DISCONTINUED | OUTPATIENT
Start: 2022-10-03 | End: 2022-10-07 | Stop reason: HOSPADM

## 2022-10-03 RX ORDER — LANOLIN ALCOHOL/MO/W.PET/CERES
10 CREAM (GRAM) TOPICAL
Status: DISCONTINUED | OUTPATIENT
Start: 2022-10-03 | End: 2022-10-07 | Stop reason: HOSPADM

## 2022-10-03 RX ORDER — BENZTROPINE MESYLATE 1 MG/ML
1 INJECTION INTRAMUSCULAR; INTRAVENOUS
Status: DISCONTINUED | OUTPATIENT
Start: 2022-10-03 | End: 2022-10-07 | Stop reason: HOSPADM

## 2022-10-03 RX ORDER — GABAPENTIN 100 MG/1
100 CAPSULE ORAL 3 TIMES DAILY
Status: DISCONTINUED | OUTPATIENT
Start: 2022-10-03 | End: 2022-10-07 | Stop reason: HOSPADM

## 2022-10-03 RX ORDER — LORAZEPAM 2 MG/ML
1 INJECTION INTRAMUSCULAR EVERY 4 HOURS PRN
Status: DISCONTINUED | OUTPATIENT
Start: 2022-10-03 | End: 2022-10-07 | Stop reason: HOSPADM

## 2022-10-03 RX ORDER — LORAZEPAM 1 MG/1
1 TABLET ORAL
Status: DISCONTINUED | OUTPATIENT
Start: 2022-10-03 | End: 2022-10-07 | Stop reason: HOSPADM

## 2022-10-03 RX ORDER — ACETAMINOPHEN 325 MG/1
975 TABLET ORAL EVERY 6 HOURS PRN
Status: DISCONTINUED | OUTPATIENT
Start: 2022-10-03 | End: 2022-10-07 | Stop reason: HOSPADM

## 2022-10-03 RX ORDER — LORAZEPAM 2 MG/ML
1 INJECTION INTRAMUSCULAR
Status: DISCONTINUED | OUTPATIENT
Start: 2022-10-03 | End: 2022-10-07 | Stop reason: HOSPADM

## 2022-10-03 RX ORDER — TRAZODONE HYDROCHLORIDE 100 MG/1
100 TABLET ORAL
Status: DISCONTINUED | OUTPATIENT
Start: 2022-10-03 | End: 2022-10-07 | Stop reason: HOSPADM

## 2022-10-03 RX ORDER — MIRTAZAPINE 15 MG/1
15 TABLET, FILM COATED ORAL
Status: DISCONTINUED | OUTPATIENT
Start: 2022-10-03 | End: 2022-10-05

## 2022-10-03 RX ORDER — BENZTROPINE MESYLATE 1 MG/1
1 TABLET ORAL EVERY 6 HOURS PRN
Status: DISCONTINUED | OUTPATIENT
Start: 2022-10-03 | End: 2022-10-07 | Stop reason: HOSPADM

## 2022-10-03 RX ORDER — BENZTROPINE MESYLATE 1 MG/ML
0.5 INJECTION INTRAMUSCULAR; INTRAVENOUS
Status: DISCONTINUED | OUTPATIENT
Start: 2022-10-03 | End: 2022-10-07 | Stop reason: HOSPADM

## 2022-10-03 RX ORDER — LORAZEPAM 2 MG/ML
2 INJECTION INTRAMUSCULAR
Status: DISCONTINUED | OUTPATIENT
Start: 2022-10-03 | End: 2022-10-07 | Stop reason: HOSPADM

## 2022-10-03 RX ORDER — PRAZOSIN HYDROCHLORIDE 1 MG/1
1 CAPSULE ORAL
Status: DISCONTINUED | OUTPATIENT
Start: 2022-10-03 | End: 2022-10-07 | Stop reason: HOSPADM

## 2022-10-03 RX ORDER — POLYETHYLENE GLYCOL 3350 17 G/17G
17 POWDER, FOR SOLUTION ORAL DAILY PRN
Status: DISCONTINUED | OUTPATIENT
Start: 2022-10-03 | End: 2022-10-07 | Stop reason: HOSPADM

## 2022-10-03 RX ORDER — FOLIC ACID 1 MG/1
1 TABLET ORAL DAILY
Status: DISCONTINUED | OUTPATIENT
Start: 2022-10-03 | End: 2022-10-04

## 2022-10-03 RX ORDER — FLUOXETINE HYDROCHLORIDE 20 MG/1
20 CAPSULE ORAL DAILY
Status: DISCONTINUED | OUTPATIENT
Start: 2022-10-04 | End: 2022-10-05

## 2022-10-03 RX ORDER — HALOPERIDOL 5 MG/1
5 TABLET ORAL
Status: DISCONTINUED | OUTPATIENT
Start: 2022-10-03 | End: 2022-10-07 | Stop reason: HOSPADM

## 2022-10-03 RX ORDER — ACETAMINOPHEN 325 MG/1
650 TABLET ORAL EVERY 4 HOURS PRN
Status: DISCONTINUED | OUTPATIENT
Start: 2022-10-03 | End: 2022-10-07 | Stop reason: HOSPADM

## 2022-10-03 RX ORDER — HYDROXYZINE HYDROCHLORIDE 25 MG/1
25 TABLET, FILM COATED ORAL
Status: DISCONTINUED | OUTPATIENT
Start: 2022-10-03 | End: 2022-10-07 | Stop reason: HOSPADM

## 2022-10-03 RX ORDER — HYDROXYZINE 50 MG/1
50 TABLET, FILM COATED ORAL
Status: DISCONTINUED | OUTPATIENT
Start: 2022-10-03 | End: 2022-10-07 | Stop reason: HOSPADM

## 2022-10-03 RX ORDER — HALOPERIDOL 5 MG/ML
5 INJECTION INTRAMUSCULAR
Status: DISCONTINUED | OUTPATIENT
Start: 2022-10-03 | End: 2022-10-07 | Stop reason: HOSPADM

## 2022-10-03 RX ORDER — LANOLIN ALCOHOL/MO/W.PET/CERES
100 CREAM (GRAM) TOPICAL DAILY
Status: DISCONTINUED | OUTPATIENT
Start: 2022-10-03 | End: 2022-10-07 | Stop reason: HOSPADM

## 2022-10-03 RX ORDER — HALOPERIDOL 5 MG/ML
2.5 INJECTION INTRAMUSCULAR
Status: DISCONTINUED | OUTPATIENT
Start: 2022-10-03 | End: 2022-10-07 | Stop reason: HOSPADM

## 2022-10-03 RX ORDER — DULOXETIN HYDROCHLORIDE 60 MG/1
60 CAPSULE, DELAYED RELEASE ORAL ONCE
Status: DISCONTINUED | OUTPATIENT
Start: 2022-10-03 | End: 2022-10-03

## 2022-10-03 RX ORDER — HALOPERIDOL 2 MG/1
2 TABLET ORAL
Status: DISCONTINUED | OUTPATIENT
Start: 2022-10-03 | End: 2022-10-07 | Stop reason: HOSPADM

## 2022-10-03 RX ORDER — MIRTAZAPINE 15 MG/1
30 TABLET, FILM COATED ORAL ONCE
Status: DISCONTINUED | OUTPATIENT
Start: 2022-10-03 | End: 2022-10-03

## 2022-10-03 RX ORDER — MAGNESIUM HYDROXIDE/ALUMINUM HYDROXICE/SIMETHICONE 120; 1200; 1200 MG/30ML; MG/30ML; MG/30ML
30 SUSPENSION ORAL EVERY 4 HOURS PRN
Status: DISCONTINUED | OUTPATIENT
Start: 2022-10-03 | End: 2022-10-07 | Stop reason: HOSPADM

## 2022-10-03 RX ADMIN — FOLIC ACID 1 MG: 1 TABLET ORAL at 12:04

## 2022-10-03 RX ADMIN — NICOTINE 14 MG: 14 PATCH, EXTENDED RELEASE TRANSDERMAL at 09:14

## 2022-10-03 RX ADMIN — B-COMPLEX W/ C & FOLIC ACID TAB 1 TABLET: TAB at 12:04

## 2022-10-03 RX ADMIN — ACETAMINOPHEN 650 MG: 325 TABLET ORAL at 14:07

## 2022-10-03 RX ADMIN — GABAPENTIN 100 MG: 100 CAPSULE ORAL at 17:14

## 2022-10-03 RX ADMIN — THIAMINE HCL TAB 100 MG 100 MG: 100 TAB at 12:04

## 2022-10-03 RX ADMIN — MIRTAZAPINE 15 MG: 15 TABLET, FILM COATED ORAL at 20:42

## 2022-10-03 RX ADMIN — DULOXETINE HYDROCHLORIDE 90 MG: 30 CAPSULE, DELAYED RELEASE ORAL at 09:14

## 2022-10-03 RX ADMIN — Medication 10.5 MG: at 20:42

## 2022-10-03 RX ADMIN — GABAPENTIN 100 MG: 100 CAPSULE ORAL at 12:04

## 2022-10-03 RX ADMIN — GABAPENTIN 100 MG: 100 CAPSULE ORAL at 20:42

## 2022-10-03 RX ADMIN — PRAZOSIN HYDROCHLORIDE 1 MG: 1 CAPSULE ORAL at 20:41

## 2022-10-03 NOTE — ED NOTES
Pt transported to [facility] via [facility] with ALL BELONGINGS (black purse, medications, wallet, cell phone, 3 blue suitcases, 1 cardboard box )      Faye Melvin RN  10/03/22 1017
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
## 2022-10-03 NOTE — PLAN OF CARE
Problem: DISCHARGE PLANNING - CARE MANAGEMENT  Goal: Discharge to post-acute care or home with appropriate resources  Description: INTERVENTIONS:  - Conduct assessment to determine patient/family and health care team treatment goals, and need for post-acute services based on payer coverage, community resources, and patient preferences, and barriers to discharge  - Address psychosocial, clinical, and financial barriers to discharge as identified in assessment in conjunction with the patient/family and health care team  - Arrange appropriate level of post-acute services according to patients   needs and preference and payer coverage in collaboration with the physician and health care team  - Communicate with and update the patient/family, physician, and health care team regarding progress on the discharge plan  - Arrange appropriate transportation to post-acute venues  Outcome: Progressing     Pt new 201, pt progressing

## 2022-10-03 NOTE — TELEMEDICINE
TeleConsultation - Ana Richards 1527 29 y o  female MRN: [id number]  Unit/Bed#: ED 10 Encounter: [id number]        REQUIRED DOCUMENTATION:     1  This service was provided via Telemedicine  2  Provider located at [place]  3  TeleMed provider: Seema Real MD   4  Identify all parties in room with patient during tele consult:  Patient  5  Patient was then informed that this was a Telemedicine visit and that the exam was being conducted confidentially over secure lines  My office door was closed  No one else was in the room  Patient acknowledged consent and understanding of privacy and security of the Telemedicine visit, and gave us permission to have the assistant stay in the room in order to assist with the history and to conduct the exam   I informed the patient that I have reviewed their record in Epic and presented the opportunity for them to ask any questions regarding the visit today  The patient agreed to participate  Assessment/Plan     Active Problems:    * No active hospital problems  *    Assessment:  Unspecified mood disorder; History of PTSD, MDD, DID, Anorexia, and borderline personality disorder  Treatment Plan:  Inpatient psychiatric treatment is indicated for provision of precautions, further diagnostic evaluation and treatment stabilization  Continual observation suicide precautions are indicated  Current Medications:     Current Facility-Administered Medications   Medication Dose Route Frequency Provider Last Rate   • [START ON 10/3/2022] DULoxetine  90 mg Oral Daily Bin Li DO     • mirtazapine  30 mg Oral HS Bin Li DO     • nicotine  14 mg Transdermal Once Bin Li DO     • [START ON 10/3/2022] nicotine  14 mg Transdermal Daily Bin Li DO         Risks / Benefits of Treatment:    Risks, benefits, and possible side effects of medications explained to patient and patient verbalizes understanding        Consult to Psychiatry  Consult performed by: Eliza Mckinnon MD  Consult ordered by: Dusty Miller MD        Physician Requesting Consult: Dusty Miller MD  Principal Problem:<principal problem not specified>    Reason for Consult:  Suicidal ideation      History of Present Illness      Patient is a 29 y o  female who presented to the emergency department were crisis obtained documented the following information: “Pt presented to the ED today intoxicated  When sober, patient requested to speak with CW  Pt stated that she had thoughts of self harm this past week with a plan, but did not disclose the plan to cw  Pt stated that she felt safe in the ED and was not currently having thoughts of SI at this time  Pt denied HI, voices or visions  Pt stated that she does not have anyone to live with anymore, and she does not have any stable housing  Pt stated that she has no mental health treatment currently and is only sometimes compliant with her medication  Pt stated that she only sleeps 6 hours with medication  Pt stated that she is losing weight  Pt stated that she has chronic mental health issues, and as she stated, "I have everything in the DSM " When asked more details about this statement, patient detailed a list of being diagnosed, PTSD, MDD, DID, Anorexia, and borderline personality disorder  Pt stated that she usually is not intoxicated, and denied any substance abuse  Pt admitted that she vapes daily nicotine  Pt is willing to sign to come into the Hospital for mental health treatment  Emergency department documented the following:  “  Pt presents to ED via EMS from [facility]""el where pt was found unresponsive on lobby on couch  Pt responses to sternal rub        27-year-old female previous history of alcohol abuse, boderline personality disorder, self harm, PTSD, hypokalemia presents for altered mental status     Patient found dressed nicely, passed out on a hotel couch      Patient initially speaking English to EMS  Arouse to sternal rub only  Following commands when awake  Was given Narcan without relief of symptoms      In emergency department patient denies that she speaks [place]  Patient follows commands when I ask her to do things in [place] but otherwise speaks [place]  And tells me that she does not speak [place]      Unable to find a osbaldo  on our [address]      Review of records reveals the patient spoke English on multiple other visits to multiple other hospitals         Altered Mental Status  Presenting symptoms: disorientation and lethargy    Severity:  Severe  Most recent episode: Today  Episode history:  Single  Timing:  Constant  Progression:  Unchanged  Chronicity:  New  Context: alcohol use          Prior to Admission Medications   Prescriptions Last Dose Informant Patient Reported? Taking? DULoxetine (CYMBALTA) 60 mg delayed release capsule     Yes Yes   Sig: Take 60 mg by mouth daily   mirtazapine (REMERON) 30 mg tablet     Yes Yes   Sig: Take 30 mg by mouth daily at bedtime      Facility-Administered Medications: None         Medical History[]Expand by Default        Past Medical History:   Diagnosis Date   • Alcohol abuse     • Anxiety     • Borderline personality disorder (HCC)     • Depression     • Eating disorder     • PTSD (post-traumatic stress disorder)     • Sleep difficulties              Surgical History[]Expand by Default   No past surgical history on file         Family History[]Expand by Default   No family history on file  I have reviewed and agree with the history as documented            E-Cigarette/Vaping   • E-Cigarette Use Current Every Day User        E-Cigarette/Vaping Substances      Social History            Vaping Use   • Vaping Use: Every day   Substance Use Topics   • Alcohol use: Yes       Comment: Patient stated social drinking   • Drug use: Never         Review of Systems   Unable to perform ROS: Acuity of condition     Past psychiatric history:  As above  Patient states she ran out of her Remeron 1 month ago as been feeling more depressed since that time  She has continued her Cymbalta  She states the Remeron was particularly helpful for helping her disrupted sleep from PTSD  Social history:  The patient reports she is single with 1 child  She denies being a current relationship  When asked if she had a recent break-up in a relationship she pause for some time and then said “not really”  She did share that she is homeless after the event last week but would not further elaborate on this  She has a 8year-old son who lives with the son's father  She was very poor historian  When asked about work she reported over go not anymore”  Family history:  Unremarkable per patient     Substance use history:  The patient says her alcohol use varies and did not further elaborate  She did admit to vaping nicotine  She denied other substance use  Mental status examination:  The patient was alert and resting on bed during the assessment  She kept her eyes closed  Affect is blunted  She appeared very apathetic and poorly participative in the evaluation  Responses typically extremely brief  She appeared very apathetic regarding the assessment  Speech was monotone  Sensorium is clear  Thought process was logical linear  Thought content is reality based  Associations were tight  Memory appear to be intact in all spheres although is difficult to fully evaluate as the patient was very guarded in apathetic  She appears to be of average intelligence by her use vocabulary, general fund of knowledge, sentence structure and syntax  She admits suicidal ideation  When asked if she was suicidal here in the emergency department she stated that she was on one-to-one so there she would not be able to commit suicide here  She denies homicidal ideation  She denies hallucinations other psychotic features  She feels very hopeless and helpless at this time  Insight and judgment are impaired  Past Medical History:   Diagnosis Date   • Alcohol abuse    • Anxiety    • Borderline personality disorder ([facility] [address] )    • Depression    • Eating disorder    • PTSD (post-traumatic stress disorder)    • Sleep difficulties        Medical Review Of Systems:    Review of Systems    Meds/Allergies     all current active meds have been reviewed  No Known Allergies    Objective     Vital signs in last 24 hours:  Temp:  [98 4 °F (36 9 °C)] 98 4 °F (36 9 °C)  HR:  [69] 69  Resp:  [18] 18  BP: (107)/(70) 107/70    No intake or output data in the 24 hours ending 10/02/22 2201    Lab Results: I have personally reviewed all pertinent laboratory/tests results  Imaging Studies: XR chest 1 view portable    Result Date: 10/1/2022  Narrative: CHEST INDICATION:   cough  COMPARISON:  None EXAM PERFORMED/VIEWS:  XR CHEST PORTABLE FINDINGS:  Monitoring leads and clips project over the chest  Cardiomediastinal silhouette appears unremarkable  The lungs are clear  No pneumothorax or pleural effusion  Osseous structures appear within normal limits for patient age  Impression: No acute cardiopulmonary disease  Workstation performed: [id number]     CT head without contrast    Result Date: 10/1/2022  Narrative: CT BRAIN - WITHOUT CONTRAST INDICATION:   ams  COMPARISON:  None  TECHNIQUE:  CT examination of the brain was performed  In addition to axial images, sagittal and coronal 2D reformatted images were created and submitted for interpretation  Radiation dose length product (DLP) for this visit:  829 mGy-cm   This examination, like all CT scans performed in the [facility], was performed utilizing techniques to minimize radiation dose exposure, including the use of iterative reconstruction and automated exposure control  IMAGE QUALITY:  Diagnostic  FINDINGS: PARENCHYMA:  No intracranial mass, mass effect or midline shift  No CT signs of acute infarction    No acute parenchymal hemorrhage  VENTRICLES AND EXTRA-AXIAL SPACES:  Normal for the patient's age  VISUALIZED ORBITS AND PARANASAL SINUSES:  Unremarkable  CALVARIUM AND EXTRACRANIAL SOFT TISSUES:  Normal      Impression: No acute intracranial abnormality  Workstation performed: [id number]     EKG/Pathology/Other Studies: No results found for: VENTRATE, ATRIALRATE, PRINT, QRSDINT, QTINT, QTCINT, PAXIS, QRSAXIS, TWAVEAXIS     Code Status: No Order  Advance Directive and Living Will:      Power of :    POLST:      Counseling / Coordination of Care: Total floor / unit time spent today 30 minutes  Greater than 50% of total time was spent with the patient and / or family counseling and / or coordination of care  A description of the counseling / coordination of care:  Chart review, patient evaluation, coordination communication with staff, nursing and provider

## 2022-10-03 NOTE — EMTALA/ACUTE CARE TRANSFER
[facility] EMERGENCY DEPARTMENT  [address] [address]  Dept: [phone]      EMTALA TRANSFER CONSENT    NAME Adrián Fernandes 1985                              MRN [id number]    I have been informed of my rights regarding examination, treatment, and transfer   by Dr Tawnya Bhatia MD    Benefits: Specialized equipment and/or services available at the receiving facility (Include comment)________________________, Continuity of care, Other benefits (Include comment)_______________________ (inpatient mental health 201)    Risks: Potential for delay in receiving treatment, Potential deterioration of medical condition, Possible worsening of condition or death during transfer, Increased discomfort during transfer      Consent for Transfer:  I acknowledge that my medical condition has been evaluated and explained to me by the emergency department physician or other qualified medical person and/or my attending physician, who has recommended that I be transferred to the service of  Accepting Physician: Dr Lexus Ellsworth at [address] [facility] Name, [facility] & State : Sindy Newman [address], [address]  The above potential benefits of such transfer, the potential risks associated with such transfer, and the probable risks of not being transferred have been explained to me, and I fully understand them  The doctor has explained that, in my case, the benefits of transfer outweigh the risks  I agree to be transferred  I authorize the performance of emergency medical procedures and treatments upon me in both transit and upon arrival at the receiving facility  Additionally, I authorize the release of any and all medical records to the receiving facility and request they be transported with me, if possible    I understand that the safest mode of transportation during a medical emergency is an ambulance and that the Hospital advocates the use of this mode of transport  Risks of traveling to the receiving facility by car, including absence of medical control, life sustaining equipment, such as oxygen, and medical personnel has been explained to me and I fully understand them  ([place] CORRECT BOX BELOW)  [  ]  I consent to the stated transfer and to be transported by ambulance/helicopter  [  ]  I consent to the stated transfer, but refuse transportation by ambulance and accept full responsibility for my transportation by car  I understand the risks of non-ambulance transfers and I exonerate the Hospital and its staff from any deterioration in my condition that results from this refusal     X___________________________________________    DATE  10/02/22  TIME________  Signature of patient or legally responsible individual signing on patient behalf           RELATIONSHIP TO PATIENT_________________________          Provider Certification    NAME Carley York                                         1985                              MRN [id number]    A medical screening exam was performed on the above named patient  Based on the examination:    Condition Necessitating Transfer The primary encounter diagnosis was Suicidal ideation  Diagnoses of Alcohol intoxication ([facility] [address] ) and Altered mental status were also pertinent to this visit      Patient Condition: The patient has been stabilized such that within reasonable medical probability, no material deterioration of the patient condition or the condition of the unborn child(kenrick) is likely to result from the transfer    Reason for Transfer: Level of Care needed not available at this facility, No bed available at level of patient's needs, Other (Include comment)____________________ (inpatient mental health 201)    Transfer Requirements: Facility  [facility] [address], [place]   · Space available and qualified personnel available for treatment as acknowledged by Crisis [phone]  · Agreed to accept transfer and to provide appropriate medical treatment as acknowledged by       Dr Laxmi Tolbert  · Appropriate medical records of the examination and treatment of the patient are provided at the time of transfer   [address], [address] _______  · Transfer will be performed by qualified personnel from [address] [place] [address]  and appropriate transfer equipment as required, including the use of necessary and appropriate life support measures  Provider Certification: I have examined the patient and explained the following risks and benefits of being transferred/refusing transfer to the patient/family:  General risk, such as traffic hazards, adverse weather conditions, rough terrain or turbulence, possible failure of equipment (including vehicle or aircraft), or consequences of actions of persons outside the control of the transport personnel, Unanticipated needs of medical equipment and personnel during transport, Risk of worsening condition, The possibility of a transport vehicle being unavailable, The patient is stable for psychiatric transfer because they are medically stable, and is protected from harming him/herself or others during transport      Based on these reasonable risks and benefits to the patient and/or the unborn child(kenrick), and based upon the information available at the time of the patient’s examination, I certify that the medical benefits reasonably to be expected from the provision of appropriate medical treatments at another medical facility outweigh the increasing risks, if any, to the individual’s medical condition, and in the case of labor to the unborn child, from effecting the transfer      X____________________________________________ DATE 10/02/22        TIME_______      ORIGINAL - SEND TO MEDICAL RECORDS   COPY - SEND WITH PATIENT DURING TRANSFER

## 2022-10-03 NOTE — NURSING NOTE
Pt is 28 yo F here at 11:12on a 201 w/ passive SI, no current plan  Pt has NKDA, UDS- COVID-  Pt is a freq daily tobacco/vape user  Pt is currently homeless after a recent break-up with partner approx 3 days ago, and describes feeling unsafe in the street to ED staff per report  Pt has excessive quantity of belongings to be put into storage  Pt states, " I have absolutely no one that can take my stuff for me"  Pt has an extensive HX of inpatient admissions; including several to [facility] to be treated for eating disorders  Pt self describes food restricting at times, and also a HX of purging  Pt states, "I am currently not eating carbs and most lipids, but will eat lean proteins, fruits and vegetables"  Staff witnessed Pt eat chicken out of sandwich, and fruit cup on arrival  Pt describes herself as a binge drinker who's current admission was preceded by a SA  Pt describes drinking 3/4 litre bottle of vodka, and 1 1/5 bottle of wine  Pt states, " I woke up in the hotel lobby when EMS was there  Obviously my plan didn't work"  Pt currently denies SI/HI  Denies psychosis  Scores 7 CIWA for anxiety and headache  Refuses PRN medications  Agrees to notify staff if SI returns, or thoughts of self harm  Pt will remain safe on unit and participate in [facility]  Staff will round Q7 and offer therapeutic support and positive encouragement

## 2022-10-03 NOTE — PROGRESS NOTES
10/03/22 1316   Team Meeting   Meeting Type Tx Team Meeting   Team Members Present   Team Members Present Physician;Nurse;   Physician Team Member Dr Vin Ocampo Do   Nursing Team Member Augustina Coronado, HEATHER   Social Work Team Member Celestino Henning   Patient/Family Present   Patient Present Yes   Patient's Family Present No   Patient and treatment team met and reviewed pt strengths, limitations, coping skills, treatment plan and goals; pt agreeable and signed; copy on chart

## 2022-10-03 NOTE — PROGRESS NOTES
Discussed with patient: AUDIT score of 23   UDS/Identified Substance(s) used: THC  Risks discussed included: mental health, physical health risks  Recommendations discussed: IP and OP MH tx recs  Patient's response: pt agreeable to OP D&A tx at this time; concerned about insurance regarding IP

## 2022-10-03 NOTE — ED CARE HANDOFF
Emergency Department Sign Out Note        Signout and transfer of care from my colleague, Dr Ez Jones  See Separate Emergency Department note  The patient, Raul Brizuela, was evaluated for altered mental status/alcohol intoxication/suicidal ideation      Labs Reviewed   CBC AND DIFFERENTIAL - Abnormal       Result Value Ref Range Status    WBC 5 56  4 31 - 10 16 Thousand/uL Final    RBC 3 78 (*) 3 81 - 5 12 Million/uL Final    Hemoglobin 12 3  11 5 - 15 4 g/dL Final    Hematocrit 35 4  34 8 - 46 1 % Final    MCV 94  82 - 98 fL Final    MCH 32 5  26 8 - 34 3 pg Final    MCHC 34 7  31 4 - 37 4 g/dL Final    RDW 13 2  11 6 - 15 1 % Final    MPV 8 7 (*) 8 9 - 12 7 fL Final    Platelets 313  760 - 390 Thousands/uL Final    nRBC 0  /100 WBCs Final    Neutrophils Relative 59  43 - 75 % Final    Immat GRANS % 0  0 - 2 % Final    Lymphocytes Relative 33  14 - 44 % Final    Monocytes Relative 6  4 - 12 % Final    Eosinophils Relative 1  0 - 6 % Final    Basophils Relative 1  0 - 1 % Final    Neutrophils Absolute 3 27  1 85 - 7 62 Thousands/µL Final    Immature Grans Absolute 0 01  0 00 - 0 20 Thousand/uL Final    Lymphocytes Absolute 1 85  0 60 - 4 47 Thousands/µL Final    Monocytes Absolute 0 33  0 17 - 1 22 Thousand/µL Final    Eosinophils Absolute 0 04  0 00 - 0 61 Thousand/µL Final    Basophils Absolute 0 06  0 00 - 0 10 Thousands/µL Final   BLOOD GAS, VENOUS - Abnormal    pH, Yao 7 416 (*) 7 300 - 7 400 Final    pCO2, Yao 41 6 (*) 42 0 - 50 0 mm Hg Final    pO2, Yao 146 8 (*) 35 0 - 45 0 mm Hg Final    HCO3, Yao 26 1  24 - 30 mmol/L Final    Base Excess, Yao 1 4  mmol/L Final    O2 Content, Yao 17 6  ml/dL Final    O2 HGB, VENOUS 96 4 (*) 60 0 - 80 0 % Final   MEDICAL ALCOHOL - Abnormal    Ethanol Lvl 392 (*) <10 mg/dL Final   ACETAMINOPHEN LEVEL - Abnormal    Acetaminophen Level <10 (*) 10 - 20 ug/mL Final   POCT ALCOHOL BREATH TEST - Abnormal    EXTBreath Alcohol 0 207   Corrected   NOVEL CORONAVIRUS (COVID-19), PCR [facility] - Normal    SARS-CoV-2 Negative  Negative Final    Narrative:     FOR PEDIATRIC PATIENTS - copy/paste COVID Guidelines URL to browser: [email or web address]  [employer]    SARS-CoV-2 assay is a Nucleic Acid Amplification assay intended for the  qualitative detection of nucleic acid from SARS-CoV-2 in nasopharyngeal  swabs  Results are for the presumptive identification of SARS-CoV-2 RNA  Positive results are indicative of infection with SARS-CoV-2, the virus  causing COVID-19, but do not rule out bacterial infection or co-infection  with other viruses  Laboratories within the [place] and its  territories are required to report all positive results to the appropriate  public health authorities  Negative results do not preclude SARS-CoV-2  infection and should not be used as the sole basis for treatment or other  patient management decisions  Negative results must be combined with  clinical observations, patient history, and epidemiological information  This test has not been FDA cleared or approved  This test has been authorized by FDA under an Emergency Use Authorization  (EUA)  This test is only authorized for the duration of time the  declaration that circumstances exist justifying the authorization of the  emergency use of an in vitro diagnostic tests for detection of SARS-CoV-2  virus and/or diagnosis of COVID-19 infection under section 564(b)(1) of  the Act, 21 U  S C  539OBQ-5(G)(3), unless the authorization is terminated  or revoked sooner  The test has been validated but independent review by FDA  and CLIA is pending  Test performed using [employer] GeneXpert: This RT-PCR assay targets N2,  a region unique to SARS-CoV-2  A conserved region in the E-gene was chosen  for pan-Sarbecovirus detection which includes SARS-CoV-2  According to CMS-2020-01-R, this platform meets the definition of high-throughput technology     HEPATIC FUNCTION PANEL - Normal Total Bilirubin 0 21  0 20 - 1 00 mg/dL Final    Bilirubin, Direct 0 02  0 00 - 0 20 mg/dL Final    Alkaline Phosphatase 53  34 - 104 U/L Final    AST 23  13 - 39 U/L Final    Comment: Specimen collection should occur prior to Sulfasalazine administration due to the potential for falsely depressed results  ALT 11  7 - 52 U/L Final    Comment: Specimen collection should occur prior to Sulfasalazine administration due to the potential for falsely depressed results  Total Protein 6 9  6 4 - 8 4 g/dL Final    Albumin 4 1  3 5 - 5 0 g/dL Final   CK - Normal    Total CK 84  26 - 192 U/L Final   AMMONIA - Normal    Ammonia 29  18 - 72 umol/L Final    Comment: Specimen collection should occur prior to Sulfapyridine administration due to the potential for falsely depressed results  RAPID DRUG SCREEN, URINE - Normal    Amph/Meth UR Negative  Negative Final    Barbiturate Ur Negative  Negative Final    Benzodiazepine Urine Negative  Negative Final    Cocaine Urine Negative  Negative Final    Methadone Urine Negative  Negative Final    Opiate Urine Negative  Negative Final    PCP Ur Negative  Negative Final    THC Urine Negative  Negative Final    Oxycodone Urine Negative  Negative Final    Narrative:     FOR MEDICAL PURPOSES ONLY  IF CONFIRMATION NEEDED PLEASE CONTACT THE LAB WITHIN 5 DAYS      Drug Screen Cutoff Levels:  AMPHETAMINE/METHAMPHETAMINES  1000 ng/mL  BARBITURATES     200 ng/mL  BENZODIAZEPINES     200 ng/mL  COCAINE      300 ng/mL  METHADONE      300 ng/mL  OPIATES      300 ng/mL  PHENCYCLIDINE     25 ng/mL  THC       50 ng/mL  OXYCODONE      599 ng/mL   SALICYLATE LEVEL - Normal    Salicylate Lvl <5  3 - 20 mg/dL Final   PREGNANCY TEST (HCG QUALITATIVE) - Normal    Preg, Serum Negative  Negative Final   POCT ALCOHOL BREATH TEST - Normal    EXTBreath Alcohol 0 167   Final   POCT ALCOHOL BREATH TEST - Normal    EXTBreath Alcohol 0 126   Final   POCT ALCOHOL BREATH TEST - Normal    EXTBreath Alcohol 0 087   Final   NOVEL CORONAVIRUS (COVID-19), PCR [facility]   BASIC METABOLIC PANEL    Sodium 836  135 - 147 mmol/L Final    Potassium 4 4  3 5 - 5 3 mmol/L Final    Chloride 107  96 - 108 mmol/L Final    CO2 29  21 - 32 mmol/L Final    ANION GAP 11  4 - 13 mmol/L Final    BUN 12  5 - 25 mg/dL Final    Creatinine 0 62  0 60 - 1 30 mg/dL Final    Comment: Standardized to IDMS reference method    Glucose 94  65 - 140 mg/dL Final    Comment: If the patient is fasting, the ADA then defines impaired fasting glucose as > 100 mg/dL and diabetes as > or equal to 123 mg/dL  Specimen collection should occur prior to Sulfasalazine administration due to the potential for falsely depressed results  Specimen collection should occur prior to Sulfapyridine administration due to the potential for falsely elevated results  Calcium 8 7  8 4 - 10 2 mg/dL Final    eGFR 117  ml/min/1 73sq m Final    Narrative:     [place] guidelines for Chronic Kidney Disease (CKD):   •  Stage 1 with normal or high GFR (GFR > 90 mL/min/1 73 square meters)  •  Stage 2 Mild CKD (GFR = 60-89 mL/min/1 73 square meters)  •  Stage 3A Moderate CKD (GFR = 45-59 mL/min/1 73 square meters)  •  Stage 3B Moderate CKD (GFR = 30-44 mL/min/1 73 square meters)  •  Stage 4 Severe CKD (GFR = 15-29 mL/min/1 73 square meters)  •  Stage 5 End Stage CKD (GFR <15 mL/min/1 73 square meters)  Note: GFR calculation is accurate only with a steady state creatinine   COMA PANEL    Narrative: The following orders were created for panel order Coma Panel  Procedure                               Abnormality         Status                     ---------                               -----------         ------                     Ethanol[[id number]]                      Abnormal            Final result               Salicylate UZAFF[[id number]]             Normal              Final result               Acetaminophen level-If c  Nemo Organ Nemo Organ [[id number]]  Abnormal Final result                 Please view results for these tests on the individual orders  Patient is to be [facility] for further evaluation and treatment  No acute events during shift  Patient is to be signed out to my colleague at change of shift, with plan for transfer  Procedures  MDM        Disposition  Final diagnoses:   Alcohol intoxication ([facility] )   Altered mental status   Suicidal ideation     Time reflects when diagnosis was documented in both MDM as applicable and the Disposition within this note     Time User Action Codes Description Comment    10/1/2022  4:44 PM Moshe Hand [F10 929] Alcohol intoxication ([facility] )     10/1/2022  4:44 PM Lucy Goo Add [R41 82] Altered mental status     10/1/2022 11:04 PM Osmar Gene Add [R45 851] Suicidal ideation     10/1/2022 11:04 PM Osmar Gene Modify [F10 929] [facility]     10/1/2022 11:04 PM Osmar Gene Modify [J79 815] Suicidal ideation       ED Disposition     ED Disposition   Transfer to [address] [place] [address]   --    Date/Time   Sat Oct 1, 2022 11:05 PM    Comment   Katie Hernandez should be transferred to behavioral health, and has been medically cleared             MD Documentation    Kenneth Anne Most Recent Value   Patient Condition The patient has been stabilized such that within reasonable medical probability, no material deterioration of the patient condition or the condition of the unborn child(kenrick) is likely to result from the transfer   Reason for Transfer Level of Care needed not available at this facility, No bed available at level of patient's needs, Other (Include comment)____________________  [inpatient mental health 201]   Benefits of Transfer Specialized equipment and/or services available at the receiving facility (Include comment)________________________, Continuity of care, Other benefits (Include comment)_______________________ [inpatient mental health 201]   Risks of Transfer Potential for delay in receiving treatment, Potential deterioration of medical condition, Possible worsening of condition or death during transfer, Increased discomfort during transfer   Accepting Physician Dr Kerrie Castaneda Name, Sudeep Benson [address] [place], [place]    (Name & Tel number) Crisis [phone]   Transported by (Company and Unit #) [facility]   Sending MD Dr Mikayla Kapoor MD   Provider Certification General risk, such as traffic hazards, adverse weather conditions, rough terrain or turbulence, possible failure of equipment (including vehicle or aircraft), or consequences of actions of persons outside the control of the transport personnel, Unanticipated needs of medical equipment and personnel during transport, Risk of worsening condition, The possibility of a transport vehicle being unavailable, The patient is stable for psychiatric transfer because they are medically stable, and is protected from harming him/herself or others during transport      RN Documentation    Flowsheet Row Most [address] [facility] Name, [facility] pass [address], [facility], [address] Assignment Per RN    (Name & Tel number) Crisis [phone]   Report Given to RN to [phone]   Medications Reviewed with Next Provider of Service Yes   Transport Mode Other (Comment)  [CTS]   Transported by [employer] and Unit #) [facility]   Level of Care Other (Comment)  [CTS]   Copies of Medical Records Sent History and Physical, Orders, Progress note, Transfer form, Nursing note, Labs, Med Rec form, Other (comment)  [original 201]   Patient Belongings Disposition Sent with patient   Transfer Date 10/03/22   Transfer Time 1000      Follow-up Information     Follow up With Specialties Details Why Contact Info Additional Information    Your primary doctor  Call in 1 day       [facility] [address] Call  As needed [address] [id number]  [id number]  , [place], [place], [address]        Patient's Medications   Discharge Prescriptions    No medications on file     No discharge procedures on file         ED Provider  Electronically Signed by     Dusty Miller MD  10/02/22 2012       Dusty Miller MD  10/03/22 6482

## 2022-10-03 NOTE — H&P
Molly Fajardo  :1985 F  JM    ZUF:[id number]  Adm Date: 10/3/2022 111  11:19 AM   ATT PHY: Harold Bence, [address]         Chief Complaint:  worsening depression with suicidal ideations     History of Presenting Illness: Wilmar Jama is a(n) 29 y o  female who is admitted to [address] [place] [address] on voluntary 201 commitment basis  Patient originally presented to [address] [place] [address] ED on 10/1/2022 for AMS  It was reported that patient was found unresponsive on a hotel couch  Ethanol level noted to be 392 mg/dL  Patient examined at bedside  Patient has no complaints at this time  Patient reports history of ocular migraines which is well controlled on Cymbalta  She is also on sumatriptan for abortive therapy but states has not needed to take this for awhile        No Known Allergies    Current Facility-Administered Medications on File Prior to Encounter   Medication Dose Route Frequency Provider Last Rate Last Admin    [COMPLETED] melatonin tablet 3 mg  3 mg Oral Once Sharyle Crazier, MD   3 mg at 10/02/22 2039    [COMPLETED] nicotine (NICODERM CQ) 14 mg/24hr TD 24 hr patch 14 mg  14 mg Transdermal Once Sharyle Crazier, MD   14 mg at 10/01/22 2138    [DISCONTINUED] DULoxetine (CYMBALTA) delayed release capsule 90 mg  90 mg Oral Daily Bin JATIN Li, DO   90 mg at 10/03/22 0914    [DISCONTINUED] mirtazapine (REMERON) tablet 30 mg  30 mg Oral HS Bin Li, DO   30 mg at 10/02/22 2039    [DISCONTINUED] nicotine (NICODERM CQ) 14 mg/24hr TD 24 hr patch 14 mg  14 mg Transdermal Once Bin Li, DO   14 mg at 10/02/22 181    [DISCONTINUED] nicotine (NICODERM CQ) 14 mg/24hr TD 24 hr patch 14 mg  14 mg Transdermal Daily Bin Li, DO   14 mg at 10/03/22 0063     Current Outpatient Medications on File Prior to Encounter   Medication Sig Dispense Refill    DULoxetine (CYMBALTA) 60 mg delayed release capsule Take 60 mg by mouth daily      mirtazapine (REMERON) 30 mg tablet Take 30 mg by mouth daily at bedtime       Active Ambulatory Problems     Diagnosis Date Noted    No Active Ambulatory Problems     Resolved Ambulatory Problems     Diagnosis Date Noted    No Resolved Ambulatory Problems     Past Medical History:   Diagnosis Date    Alcohol abuse     Anxiety     Borderline personality disorder ([address] )     Depression     Eating disorder     PTSD (post-traumatic stress disorder)     Sleep difficulties      No past surgical history on file  Social History:   Social History     Socioeconomic History    Marital status: Single     Spouse name: Not on file    Number of children: Not on file    Years of education: Not on file    Highest education level: Not on file   Occupational History    Not on file   Tobacco Use    Smoking status: Current Every Day Smoker     Types: Cigarettes    Smokeless tobacco: Current User   Vaping Use    Vaping Use: Every day   Substance and Sexual Activity    Alcohol use: Yes     Comment: Patient stated social drinking    Drug use: Never    Sexual activity: Yes   Other Topics Concern    Not on file   Social History Narrative    Not on file     Social Determinants of Health     Financial Resource Strain: Not on file   Food Insecurity: Not on file   Transportation Needs: Not on file   Physical Activity: Not on file   Stress: Not on file   Social Connections: Not on file   Intimate Partner Violence: Not on file   Housing Stability: Not on file     Family History: No family history on file  Review of Systems   Constitutional: Negative for chills and fever  HENT: Negative for postnasal drip, sore throat and trouble swallowing  Respiratory: Positive for cough  Cardiovascular: Negative for chest pain  Gastrointestinal: Negative for abdominal pain, nausea and vomiting  Genitourinary: Negative for difficulty urinating  Skin: Negative for rash  Neurological: Positive for headaches  All other systems reviewed and are negative  Physical Exam   Constitutional: Awake, alert, in no acute distress  Head: Normocephalic and atraumatic  Mouth/Throat: Oropharynx is clear and moist    Eyes: Conjunctivae and EOM are normal  Pupils are equal, round, and reactive to light  Neck: Neck supple  No tracheal deviation present  No thyromegaly present  Cardiovascular: Normal rate, regular rhythm and normal heart sounds  Pulmonary/Chest: Effort normal and breath sounds normal    Abdominal: Soft  Bowel sounds are normal  No distension  No tenderness  Neurological:  No acute focal deficits  Musculoskeletal: Nontender spine  Skin: Skin is warm and dry  Assessment     Radha Soriano is a(n) 29 y o  female with MDD  1  Alcohol abuse  [facility] protocol  Patient is on thiamine, folic acid, multivitamin  2  Tobacco use  Patient is on nicotine transdermal patch 21 mg/24 hr, nicotine gum as needed  3  Ocular migraines  Continue Cymbalta 90 mg daily, sumatriptan 25 mg as needed  4  Cough  Patient may take Tessalon Perles and use cough drops as needed  CXR on 10/01/2022 without acute cardiopulmonary disease  5  Vitamin B12 deficiency  Discussed results with patient  She would like to start monthly vitamin B12 injections  6  Psych with history of MDD  This is being managed by the psych team     Prognosis: Fair  Discharge Plan: In progress  Advanced Directives: I have discussed in detail with the patient the advanced directives  The patient does not have an appointed POA and does not have a living will  The patient's emergency contact is her significant other, Felipe Joyner, whose number is [phone]  When discussing cardiac and pulmonary resuscitation efforts with the patient, the patient wishes to be FULL CODE      I have spent more than 50 minutes gathering data, doing physical examination, and discussing the advanced directives, which was witnessed by caring staff  The patient was discussed with Dr Tanmay Lucero and he is in agreement with the above note

## 2022-10-03 NOTE — NURSING NOTE
Pt was admitted with the following belongings:    With patient  Underwear x3  Sports bra x1  Jeans x2  Black jacket x1  Yellow cardigan x1    Storage  bookbag  Toiletries  Sandals  Boots  Red jacket  Purse w/ misc items  Box w/ misc items and laptop  3 suitcases w/ misc items    Contraband  Headphones  Cell phone  Laptop   vape w/ vape juices x2  Phone chargers x2  [address] w/ SS card, [place] ID, insurance card, debit cards x5  Watch  Necklace    Pt signed belongings checklist

## 2022-10-03 NOTE — SOCIAL WORK
Sw left vague vm for pts employer and , Radha Mejia, ([employer] signed) [phone] regarding admission/missing work  Louise Chan called SW back regarding employer  employment as of Friday 9/30  Call ended mutually       [employer] signed  Manual List, [address]

## 2022-10-03 NOTE — PLAN OF CARE
Problem: Nutrition/Hydration-ADULT  Goal: Nutrient/Hydration intake appropriate for improving, restoring or maintaining nutritional needs  Description: Monitor and assess patient's nutrition/hydration status for malnutrition  Collaborate with interdisciplinary team and initiate plan and interventions as ordered  Monitor patient's weight and dietary intake as ordered or per policy  Utilize nutrition screening tool and intervene as necessary  Determine patient's food preferences and provide high-protein, high-caloric foods as appropriate       INTERVENTIONS:  - Monitor oral intake, urinary output, labs, and treatment plans  - Assess nutrition and hydration status and recommend course of action  - Evaluate amount of meals eaten  - Assist patient with eating if necessary   - Allow adequate time for meals  - Recommend/ encourage appropriate diets, oral nutritional supplements, and vitamin/mineral supplements  - Order, calculate, and assess calorie counts as needed  - Recommend, monitor, and adjust tube feedings and TPN/PPN based on assessed needs  - Assess need for intravenous fluids  - Provide specific nutrition/hydration education as appropriate  - Include patient/family/caregiver in decisions related to nutrition  Outcome: Progressing     Problem: Ineffective Coping  Goal: Cooperates with admission process  Description: Interventions:   - Complete admission process  Outcome: Progressing  Goal: Identifies ineffective coping skills  Outcome: Progressing  Goal: Identifies healthy coping skills  Outcome: Progressing  Goal: Demonstrates healthy coping skills  Outcome: Progressing  Goal: Participates in unit activities  Description: Interventions:  - Provide therapeutic environment   - Provide required programming   - Redirect inappropriate behaviors   Outcome: Progressing  Goal: Patient/Family participate in treatment and DC plans  Description: Interventions:  - Provide therapeutic environment  Outcome: Progressing  Goal: Patient/Family verbalizes awareness of resources  Outcome: Progressing  Goal: Understands least restrictive measures  Description: Interventions:  - Utilize least restrictive behavior  Outcome: Progressing  Goal: Free from restraint events  Description: - Utilize least restrictive measures   - Provide behavioral interventions   - Redirect inappropriate behaviors   Outcome: Progressing     Problem: Risk for Self Injury/Neglect  Goal: Treatment Goal: Remain safe during length of stay, learn and adopt new coping skills, and be free of self-injurious ideation, impulses and acts at the time of discharge  Outcome: Progressing  Goal: Verbalize thoughts and feelings  Description: Interventions:  - Assess and re-assess patient's lethality and potential for self-injury  - Engage patient in 1:1 interactions, daily, for a minimum of 15 minutes  - Encourage patient to express feelings, fears, frustrations, hopes  - Establish rapport/trust with patient   Outcome: Progressing  Goal: Refrain from harming self  Description: Interventions:  - Monitor patient closely, per order  - Develop a trusting relationship  - Supervise medication ingestion, monitor effects and side effects   Outcome: Progressing  Goal: Attend and participate in unit activities, including therapeutic, recreational, and educational groups  Description: Interventions:  - Provide therapeutic and educational activities daily, encourage attendance and participation, and document same in the medical record  - Obtain collateral information, encourage visitation and family involvement in care   Outcome: Progressing  Goal: Recognize maladaptive responses and adopt new coping mechanisms  Outcome: Progressing  Goal: Complete daily ADLs, including personal hygiene independently, as able  Description: Interventions:  - Observe, teach, and assist patient with ADLS  - Monitor and promote a balance of rest/activity, with adequate nutrition and elimination  Outcome: Progressing     Problem: Depression  Goal: Treatment Goal: Demonstrate behavioral control of depressive symptoms, verbalize feelings of improved mood/affect, and adopt new coping skills prior to discharge  Outcome: Progressing  Goal: Verbalize thoughts and feelings  Description: Interventions:  - Assess and re-assess patient's level of risk   - Engage patient in 1:1 interactions, daily, for a minimum of 15 minutes   - Encourage patient to express feelings, fears, frustrations, hopes   Outcome: Progressing  Goal: Refrain from harming self  Description: Interventions:  - Monitor patient closely, per order   - Supervise medication ingestion, monitor effects and side effects   Outcome: Progressing  Goal: Refrain from isolation  Description: Interventions:  - Develop a trusting relationship   - Encourage socialization   Outcome: Progressing  Goal: Refrain from self-neglect  Outcome: Progressing  Goal: Attend and participate in unit activities, including therapeutic, recreational, and educational groups  Description: Interventions:  - Provide therapeutic and educational activities daily, encourage attendance and participation, and document same in the medical record   Outcome: Progressing  Goal: Complete daily ADLs, including personal hygiene independently, as able  Description: Interventions:  - Observe, teach, and assist patient with ADLS  -  Monitor and promote a balance of rest/activity, with adequate nutrition and elimination   Outcome: Progressing     Problem: Anxiety  Goal: Anxiety is at manageable level  Description: Interventions:  - Assess and monitor patient's anxiety level  - Monitor for signs and symptoms (heart palpitations, chest pain, shortness of breath, headaches, nausea, feeling jumpy, restlessness, irritable, apprehensive)  - Collaborate with interdisciplinary team and initiate plan and interventions as ordered    - Almond patient to unit/surroundings  - Explain treatment plan  - Encourage participation in care  - Encourage verbalization of concerns/fears  - Identify coping mechanisms  - Assist in developing anxiety-reducing skills  - Administer/offer alternative therapies  - Limit or eliminate stimulants  Outcome: Progressing     Problem: Individualized Interventions  Goal: Patient will verbalize appropriate use of telephone within 5 days  Description: Interventions:  - Treatment team to determine use of supervised phone privileges   Outcome: Progressing  Goal: Patient will verbalize need for hospitalization and will no longer attempt elopement within 5 days  Description: Interventions:  - Ongoing education to help patient understand need for hospitalization  Outcome: Progressing  Goal: Patient will recognize inappropriate behaviors and develop alternative behaviors within 5 days  Description: Interventions:  - Patient in collaboration with Treatment Team will develop a behavior management plan to help identify effective coping skills to deal with stressors  Outcome: Progressing     Problem: SELF HARM/SUICIDALITY  Goal: Will have no self-injury during hospital stay  Description: INTERVENTIONS:  - Q 15 MINUTES: Routine safety checks  - Q WAKING SHIFT & PRN: Assess risk to determine if routine checks are adequate to maintain patient safety  - Encourage patient to participate actively in care by formulating a plan to combat response to suicidal ideation, identify supports and resources  Outcome: Progressing     Problem: DEPRESSION  Goal: Will be euthymic at discharge  Description: INTERVENTIONS:  - Administer medication as ordered  - Provide emotional support via 1:1 interaction with staff  - Encourage involvement in milieu/groups/activities  - Monitor for social isolation  Outcome: Progressing     Problem: BEHAVIOR  Goal: Pt/Family maintain appropriate behavior and adhere to behavioral management agreement, if implemented  Description: INTERVENTIONS:  - Assess the family dynamic   - Encourage verbalization of thoughts and concerns in a socially appropriate manner  - Assess patient/family's coping skills and non-compliant behavior (including use of illegal substances)  - Utilize positive, consistent limit setting strategies supporting safety of patient, staff and others  - Initiate consult with Case Management, Spiritual Care or other ancillary services as appropriate  - If a patient's/visitor's behavior jeopardizes the safety of the patient, staff, or others, refer to organization procedure     - Notify Security of behavior or suspected illegal substances which indicate the need for search of the patient and/or belongings  - Encourage participation in the decision making process about a behavioral management agreement; implement if patient meets criteria  Outcome: Progressing     Problem: ANXIETY  Goal: Will report anxiety at manageable levels  Description: INTERVENTIONS:  - Administer medication as ordered  - Teach and encourage coping skills  - Provide emotional support  - Assess patient/family for anxiety and ability to cope  Outcome: Progressing  Goal: By discharge: Patient will verbalize 2 strategies to deal with anxiety  Description: Interventions:  - Identify any obvious source/trigger to anxiety  - Staff will assist patient in applying identified coping technique/skills  - Encourage attendance of scheduled groups and activities  Outcome: Progressing     Problem: SUBSTANCE USE/ABUSE  Goal: Will have no detox symptoms and will verbalize plan for changing substance-related behavior  Description: INTERVENTIONS:  - Monitor physical status and assess for symptoms of withdrawal  - Administer medication as ordered  - Provide emotional support with 1 on 1 interaction with staff  - Encourage recovery focused program/ addiction education  - Assess for verbalization of changing behaviors related to substance abuse  - Initiate consults and referrals as appropriate (Case Management, Spiritual Care, etc )  Outcome: Progressing  Goal: By discharge, will develop insight into their chemical dependency and sustain motivation to continue in recovery  Description: INTERVENTIONS:  - Attends all daily group sessions and scheduled AA groups  - Actively practices coping skills through participation in the therapeutic community and adherence to program rules  - Reviews and completes assignments from individual treatment plan  - Assist patient development of understanding of their personal cycle of addiction and relapse triggers  Outcome: Progressing  Goal: By discharge, patient will have ongoing treatment plan addressing chemical dependency  Description: INTERVENTIONS:  - Assist patient with resources and/or appointments for ongoing recovery based living  Outcome: Progressing

## 2022-10-03 NOTE — ED NOTES
Report called to [facility] at [facility]  Advised that patient is en route with CTS        Taylor Amaro RN  10/03/22 8582

## 2022-10-04 PROBLEM — F33.2 SEVERE EPISODE OF RECURRENT MAJOR DEPRESSIVE DISORDER, WITHOUT PSYCHOTIC FEATURES (HCC): Chronic | Status: ACTIVE | Noted: 2022-10-04

## 2022-10-04 LAB
25(OH)D3 SERPL-MCNC: 40 NG/ML (ref 30–100)
ATRIAL RATE: 68 BPM
ATRIAL RATE: 68 BPM
CHOLEST SERPL-MCNC: 209 MG/DL
EST. AVERAGE GLUCOSE BLD GHB EST-MCNC: 103 MG/DL
FOLATE SERPL-MCNC: >20 NG/ML (ref 3.1–17.5)
HBA1C MFR BLD: 5.2 %
HDLC SERPL-MCNC: 62 MG/DL
LDLC SERPL CALC-MCNC: 120 MG/DL (ref 0–100)
NONHDLC SERPL-MCNC: 147 MG/DL
P AXIS: 58 DEGREES
P AXIS: 61 DEGREES
PR INTERVAL: 134 MS
PR INTERVAL: 136 MS
QRS AXIS: 86 DEGREES
QRS AXIS: 87 DEGREES
QRSD INTERVAL: 76 MS
QRSD INTERVAL: 78 MS
QT INTERVAL: 404 MS
QT INTERVAL: 404 MS
QTC INTERVAL: 429 MS
QTC INTERVAL: 429 MS
T WAVE AXIS: 63 DEGREES
T WAVE AXIS: 64 DEGREES
TRIGL SERPL-MCNC: 134 MG/DL
TSH SERPL DL<=0.05 MIU/L-ACNC: 1.42 UIU/ML (ref 0.45–4.5)
VENTRICULAR RATE: 68 BPM
VENTRICULAR RATE: 68 BPM
VIT B12 SERPL-MCNC: 264 PG/ML (ref 100–900)

## 2022-10-04 PROCEDURE — 93010 ELECTROCARDIOGRAM REPORT: CPT | Performed by: INTERNAL MEDICINE

## 2022-10-04 PROCEDURE — 80061 LIPID PANEL: CPT | Performed by: NURSE PRACTITIONER

## 2022-10-04 PROCEDURE — 82607 VITAMIN B-12: CPT

## 2022-10-04 PROCEDURE — 82306 VITAMIN D 25 HYDROXY: CPT

## 2022-10-04 PROCEDURE — 82746 ASSAY OF FOLIC ACID SERUM: CPT

## 2022-10-04 PROCEDURE — 99232 SBSQ HOSP IP/OBS MODERATE 35: CPT | Performed by: PSYCHIATRY & NEUROLOGY

## 2022-10-04 PROCEDURE — 84443 ASSAY THYROID STIM HORMONE: CPT | Performed by: NURSE PRACTITIONER

## 2022-10-04 PROCEDURE — 83036 HEMOGLOBIN GLYCOSYLATED A1C: CPT | Performed by: NURSE PRACTITIONER

## 2022-10-04 RX ORDER — BENZONATATE 100 MG/1
100 CAPSULE ORAL 3 TIMES DAILY PRN
Status: DISCONTINUED | OUTPATIENT
Start: 2022-10-04 | End: 2022-10-07 | Stop reason: HOSPADM

## 2022-10-04 RX ORDER — CYANOCOBALAMIN 1000 UG/ML
1000 INJECTION, SOLUTION INTRAMUSCULAR; SUBCUTANEOUS
Status: DISCONTINUED | OUTPATIENT
Start: 2022-10-05 | End: 2022-10-07 | Stop reason: HOSPADM

## 2022-10-04 RX ORDER — SUMATRIPTAN 25 MG/1
25 TABLET, FILM COATED ORAL DAILY PRN
Status: DISCONTINUED | OUTPATIENT
Start: 2022-10-04 | End: 2022-10-07 | Stop reason: HOSPADM

## 2022-10-04 RX ORDER — NICOTINE 21 MG/24HR
1 PATCH, TRANSDERMAL 24 HOURS TRANSDERMAL DAILY
Status: DISCONTINUED | OUTPATIENT
Start: 2022-10-04 | End: 2022-10-07 | Stop reason: HOSPADM

## 2022-10-04 RX ADMIN — GABAPENTIN 100 MG: 100 CAPSULE ORAL at 17:34

## 2022-10-04 RX ADMIN — FLUOXETINE 20 MG: 20 CAPSULE ORAL at 08:54

## 2022-10-04 RX ADMIN — GABAPENTIN 100 MG: 100 CAPSULE ORAL at 20:55

## 2022-10-04 RX ADMIN — PRAZOSIN HYDROCHLORIDE 1 MG: 1 CAPSULE ORAL at 20:56

## 2022-10-04 RX ADMIN — GABAPENTIN 100 MG: 100 CAPSULE ORAL at 08:54

## 2022-10-04 RX ADMIN — FOLIC ACID 1 MG: 1 TABLET ORAL at 08:54

## 2022-10-04 RX ADMIN — BENZONATATE 100 MG: 100 CAPSULE ORAL at 15:05

## 2022-10-04 RX ADMIN — NICOTINE 1 PATCH: 21 PATCH, EXTENDED RELEASE TRANSDERMAL at 11:31

## 2022-10-04 RX ADMIN — DULOXETINE 90 MG: 60 CAPSULE, DELAYED RELEASE ORAL at 08:54

## 2022-10-04 RX ADMIN — B-COMPLEX W/ C & FOLIC ACID TAB 1 TABLET: TAB at 08:55

## 2022-10-04 RX ADMIN — NICOTINE POLACRILEX 4 MG: 4 GUM, CHEWING BUCCAL at 13:41

## 2022-10-04 RX ADMIN — THIAMINE HCL TAB 100 MG 100 MG: 100 TAB at 08:54

## 2022-10-04 RX ADMIN — MIRTAZAPINE 15 MG: 15 TABLET, FILM COATED ORAL at 20:55

## 2022-10-04 RX ADMIN — Medication 10.5 MG: at 20:55

## 2022-10-04 NOTE — NUTRITION
10/04/22 1518   Biochemical Data,Medical Tests, and Procedures   Biochemical Data/Medical Tests/Procedures Lab values reviewed; Meds reviewed   Labs (Comment) 10/1 CMP WNL  10/4 CHOL:209, LDL:120, folate:>20 0   Meds (Comment) cogentin, Vit B12, haldol, neurontin, melatonin, MVI, remeron, minipress, thiamine   Nutrition-Focused Physical Exam   Nutrition-Focused Physical Exam Findings RN skin assessment reviewed; No skin issues documented   Nutrition-Focused Physical Exam Findings smoker, alcohol use   Medical-Related Concerns alcohol abuse, anxiety, borderline personality disorder, depression, eating disorder, PTSD, sleep difficulties   Adequacy of Intake   Nutrition Modality PO   Feeding Route   PO Independent   Current PO Intake   Current Diet Order Regular diet thin liquids, no milk, no dairy   Current Meal Intake 50-75%;%   Estimated calorie intake compared to estimated need appears to be meeting minimum nutrient needs   PES Statement   Problem Clinical   Weight (3) Underweight NC-3 1   Related to Disordered eating   As evidenced by: Per patient/family interview   Recommendations/Interventions   Malnutrition/BMI Present Yes  (however does not currently meet 2 criteria for malnutrition)   Adult BMI Classifications Underweight < 18 5   Summary Consult: BMI, eating disorder history  Low BMI  Regular diet thin liquids, no milk, no dairy  Meal intakes have been %  Patient states her appetite is "typical for me which probably wouldn't be typical for other people"  She states the amount meals she consumes depends on the day  She reports typically consuming 4 meals per day and cooks for herself  She states she has consumes boost and ensure supplement in the past but has not been recently  She reports alcohol use and per review of chart has history of medication noncompliance  She states her dairy and milk restrictions are part of her eating disorder and are not allergies   She reports difficulty incorporating lipids into her diet  She states she would be willing to try vanilla ensure compact  10/3/22-99#; 4/20/22-90#, 9#(10%) weight gain in ~6 months; 2/18/#, 5#(5%) loss in 8 months  Patient states she anticipates weight loss during this admission due to no longer consuming large amounts of alcohol which she states were providing her calories  Skin intact  Interventions/Recommendations Continue current diet order;Supplement initiate   Intervention Comments add vanilla ensure compact at breakfast   Education Assessment   Education Patient/caregiver not appropriate for education at this time   Patient Nutrition Goals   Goal Adequate intake; Wt maintained   Goal Status Initiated   Timeframe to complete goal by next f/u   Nutrition Complexity Risk   Nutrition complexity level High risk   Follow up date 10/11/22
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
## 2022-10-04 NOTE — SOCIAL WORK
Sw provided employer number [phone] for pt to call regarding benefits, last tyree gil  Pt receptive  Pt notified of [place]  Nursing and pt updated on provider order of 5 minutes supervised phone time to transfer banking funds from checking to savings   Melonie [address] <[email or web address] confirmed pt already has [place]; information entered into pts file

## 2022-10-04 NOTE — NURSING NOTE
Pt calm and cooperative  Irritable edge in the am w/ certain staff, but turned it around quickly  CIWA 1 for mild anxiety after finding out about loss of job today, but describes using coping skills and distraction (reading) to deal w/it  More visible and social w/ peers and staff in [address] [place] [address] Anum  Denies SI/HI  Denies psychosis  Slightly tearful when describing poor relationship w/ family and not being able to see her 10yo son  Theraputic support provided  Staff encourages Pt to replace negative thoughts w/positive, and focus on [facility]  Pt describes this as the first out of many admissions where she has been " really open w/staff about what is going on with me and how I truly feel"  Pt plans to cont taking medications as prescribed at [place], and hopes this will be " a new beginning"  Pt will cont to remain safe on unit and participate in [facility]  Staff will cont Q7's and offer support as needed

## 2022-10-04 NOTE — PROGRESS NOTES
10/04/22   Team Meeting   Meeting Type Daily Rounds   Team Members Present   Team Members Present Physician;Nurse;   Physician Team Member Dr Matthew Hernández MD; Dr Day Mondragon, DO; HOSP DR ELIE FLETCHER, [address] [place] [address]   Nursing Team Member Dontae Farrell, RN   Social Work Team Member Emmett Ramirez [place]   Patient/Family Present   Patient Present No   Patient's Family Present No       [place], hx eating disorder, IP, multiple SA, found unresponsive at hotel, vitals stable, no withdrawal symptoms, demanding, irritable edge, agitated, visible but not social  Plan: start Prozac, stopping Remeron

## 2022-10-04 NOTE — DISCHARGE INSTR - OTHER ORDERS
You are being discharged to [facility] inpatient rehab located at [address] [facility] [address], Phone: [phone]  Your accepting provider is Dr Christopher Eason MD  Patient Phone: [phone] (H)  Triggers you have identified during your hospitalization that led to your admission of a distressed mood alcohol use and relationship stressors  Coping skills you have identified during your hospitalization include attending group therapy and art therapy projects  If you are unable to deal with your distressed mood alone please contact Xenia March (ex-partner) at [phone]  If that is not effective and you continue to have a distressed mood, are overwhelmed, or in crisis, please contact (Crisis #) [phone], dial [phone] or go to the nearest emergency center  [facility] Crisis Hotline: [address] Suicide Prevention Lifeline:  5-785.676.3326  *Alcohol Anonymous: [address] Drug & Alcohol Commission: [address] on Mental Illness ([place]) HELPLINE: 345.353.2772/Website: www regino org  *Substance Abuse and Mental Health Services Administration([facility]) [employer], which is a confidential, free, 24-hour-a-day, 365-day-a-year, information service for individuals and family members facing mental health and/or substance use disorders  This service provides referrals to local treatment facilities, support groups, and community-based organizations  Callers can also order free publications and other information  Call 4-698.693.6495/Website: www [facility] gov  *[facility] 2-1-1: This is a toll free, confidential, 24-hour-a-day service which connects you to a community  in your area who can help you find services and resources that are available to you locally and provide critical services that can improve and save lives    Call: 211  /Website: https://suzanne-riddle net/    You declined a follow up primary care provider appointment at this time  You have been provided paper prescriptions for your medications  Violeta Lundberg or Tracey, our Karon and Vaishali, will be calling you after your discharge, on the phone number that you provided  They will be available as an additional support, if needed  If you wish to speak with one of them, you may contact Karen Camacho at [phone] or Shankar Chino at [phone]

## 2022-10-04 NOTE — PROGRESS NOTES
10/04/22 1000   Activity/Group Checklist   Group   (The Thoughts in [address] [place] [address])   Attendance Attended   Attendance Duration (min) Greater than 60   Interactions Interacted appropriately   Affect/Mood Appropriate   Goals Achieved Identified feelings; Identified triggers; Identified relapse prevention strategies; Discussed coping strategies; Identified resources and support systems; Able to listen to others; Able to engage in interactions; Able to reflect/comment on own behavior;Able to manage/cope with feelings

## 2022-10-04 NOTE — PROGRESS NOTES
10/04/22 0730   Activity/Group Checklist   Group   (Morning Community Meeting with Goal Setting)   Attendance Attended   Attendance Duration (min) 46-60   Interactions Interacted appropriately   Affect/Mood Appropriate   Goals Achieved Identified feelings; Identified triggers; Identified relapse prevention strategies; Discussed coping strategies; Able to listen to others; Able to engage in interactions; Able to reflect/comment on own behavior;Able to manage/cope with feelings

## 2022-10-04 NOTE — NURSING NOTE
Patient has been visible on the unit  Sitting by herself in the DR  Irritable edge  Says she is not having any thoughts to hurt herself "right now"  Says she feels more "generally apathetic" than suicidal   Reports the gabapentin has been helping her anxiety  Reviewed scheduled medications with her  BP initially too low for prazosin  Fluid encouraged    Recheck of BP allowed for prazosin to be given

## 2022-10-04 NOTE — TREATMENT PLAN
TREATMENT PLAN REVIEW - [address] 29 y o  1985 female MRN: [id number]    [facility] [address] [facility] Room / Bed: [address] [facility] Encounter: [id number]          Admit Date/Time:  10/3/2022 11:19 AM    Treatment Team: Attending Provider: Zi Gomez MD; Consulting Physician: Oly Chambers MD; Patient Care Assistant: Bharati Almonte; Care Manager: Olga Chavez, HEATHER; Registered Nurse: Brittanie Rae, RN; Registered Nurse: Carlos Chandler, RN; Registered Nurse: Karlos Pizarro RN    Diagnosis: Principal Problem:    Borderline personality disorder [facility]  Active Problems:    Eating disorder    Alcohol abuse      Patient Strengths/Assets: average or above intelligence, cooperative, communication skills, general fund of knowledge, interpersonal skills, patient is on a voluntary commitment, well educated, work skills    Patient Barriers/Limitations: financial instability, homeless, lack of social/family support, lack of stable employment, limited motivation, limited support system, low self esteem, noncompliant with medication, noncompliant with treatment, limited insight, poor physical health, poor self-care, alcohol use disorder, history of multiple inpatient psych admissions, history of suicide attempts, borderline personality disorder    Short Term Goals: decrease in depressive symptoms, decrease in anxiety symptoms, decrease in suicidal thoughts, decrease in self abusive behaviors, improvement in insight, improvement in reality testing, improvement in self care, acceptance of need for psychiatric treatment, acceptance of psychiatric medications, improvement with medication compliance    Long Term Goals: improvement in depression, improvement in anxiety, stabilization of mood, free of suicidal thoughts, no self abusive behavior, improved insight, acceptance of need for psychiatric medications, acceptance of need for psychiatric treatment, acceptance of need for psychiatric follow up after discharge, adequate self care, adequate sleep, adequate oral intake, appropriate interaction with peers, appropriate interaction with family, stable living arrangements upon discharge, establishment of family support upon discharge    Progress Towards Goals: starting psychiatric medications as prescribed, continue psychiatric medications as prescribed    Recommended Treatment: medication management, patient medication education, group therapy, milieu therapy, continued Behavioral Health psychiatric evaluation/assessment process    Treatment Frequency: daily medication monitoring, group and milieu therapy daily, monitoring through interdisciplinary rounds, monitoring through weekly patient care conferences    Expected Discharge Date:  10/10/22    Discharge Plan: discharge to a homeless shelter, referral for outpatient medication management with a psychiatrist, referral for outpatient psychotherapy    Treatment Plan Created/Updated By: David Martin DO

## 2022-10-04 NOTE — PROGRESS NOTES
10/04/22 1221   Referral Data   Referral Reason [address] of Residence last: [facility]   Readmission Root Cause   30 Day Readmission No   Patient Information   Mental Status Alert   Primary Caregiver Self   Support System Other (Comment)  (pt reports no support system)   Episcopal/Cultural Requests no requests on unit   Legal Information   Tx Plan Signed Yes   Current Status: 13   Legal Issues child support case in [address] [place] [address] of Daily Living Prior to Admission   Functional Status Independent   Assistive Device No device needed   Living Arrangement Homeless   Ambulation Independent   Access to Firearms   Access to Firearms No   [address] Unemployed  (seperated from employer 9/30/22 ())   [employer] of Transportation   [employer] of Transport to [facility]: Public Transportation - [address]
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
## 2022-10-05 PROCEDURE — 99232 SBSQ HOSP IP/OBS MODERATE 35: CPT | Performed by: HOSPITALIST

## 2022-10-05 RX ORDER — FLUOXETINE HYDROCHLORIDE 20 MG/1
40 CAPSULE ORAL DAILY
Status: DISCONTINUED | OUTPATIENT
Start: 2022-10-06 | End: 2022-10-07 | Stop reason: HOSPADM

## 2022-10-05 RX ADMIN — CYANOCOBALAMIN 1000 MCG: 1000 INJECTION, SOLUTION INTRAMUSCULAR; SUBCUTANEOUS at 09:14

## 2022-10-05 RX ADMIN — DULOXETINE 90 MG: 60 CAPSULE, DELAYED RELEASE ORAL at 09:09

## 2022-10-05 RX ADMIN — B-COMPLEX W/ C & FOLIC ACID TAB 1 TABLET: TAB at 09:09

## 2022-10-05 RX ADMIN — NICOTINE 1 PATCH: 21 PATCH, EXTENDED RELEASE TRANSDERMAL at 09:08

## 2022-10-05 RX ADMIN — THIAMINE HCL TAB 100 MG 100 MG: 100 TAB at 09:09

## 2022-10-05 RX ADMIN — NICOTINE POLACRILEX 4 MG: 4 GUM, CHEWING BUCCAL at 19:09

## 2022-10-05 RX ADMIN — GABAPENTIN 100 MG: 100 CAPSULE ORAL at 21:12

## 2022-10-05 RX ADMIN — Medication 10.5 MG: at 21:12

## 2022-10-05 RX ADMIN — GABAPENTIN 100 MG: 100 CAPSULE ORAL at 09:09

## 2022-10-05 RX ADMIN — PRAZOSIN HYDROCHLORIDE 1 MG: 1 CAPSULE ORAL at 21:12

## 2022-10-05 RX ADMIN — NICOTINE POLACRILEX 4 MG: 4 GUM, CHEWING BUCCAL at 12:52

## 2022-10-05 RX ADMIN — GABAPENTIN 100 MG: 100 CAPSULE ORAL at 15:45

## 2022-10-05 RX ADMIN — FLUOXETINE 20 MG: 20 CAPSULE ORAL at 09:09

## 2022-10-05 NOTE — PLAN OF CARE
Problem: Nutrition/Hydration-ADULT  Goal: Nutrient/Hydration intake appropriate for improving, restoring or maintaining nutritional needs  Description: Monitor and assess patient's nutrition/hydration status for malnutrition  Collaborate with interdisciplinary team and initiate plan and interventions as ordered  Monitor patient's weight and dietary intake as ordered or per policy  Utilize nutrition screening tool and intervene as necessary  Determine patient's food preferences and provide high-protein, high-caloric foods as appropriate       INTERVENTIONS:  - Monitor oral intake, urinary output, labs, and treatment plans  - Assess nutrition and hydration status and recommend course of action  - Evaluate amount of meals eaten  - Assist patient with eating if necessary   - Allow adequate time for meals  - Recommend/ encourage appropriate diets, oral nutritional supplements, and vitamin/mineral supplements  - Order, calculate, and assess calorie counts as needed  - Recommend, monitor, and adjust tube feedings and TPN/PPN based on assessed needs  - Assess need for intravenous fluids  - Provide specific nutrition/hydration education as appropriate  - Include patient/family/caregiver in decisions related to nutrition  Outcome: Progressing     Problem: Ineffective Coping  Goal: Cooperates with admission process  Description: Interventions:   - Complete admission process  Outcome: Progressing  Goal: Identifies healthy coping skills  Outcome: Progressing  Goal: Demonstrates healthy coping skills  Outcome: Progressing  Goal: Participates in unit activities  Description: Interventions:  - Provide therapeutic environment   - Provide required programming   - Redirect inappropriate behaviors   Outcome: Progressing     Problem: Risk for Self Injury/Neglect  Goal: Treatment Goal: Remain safe during length of stay, learn and adopt new coping skills, and be free of self-injurious ideation, impulses and acts at the time of discharge  Outcome: Progressing  Goal: Refrain from harming self  Description: Interventions:  - Monitor patient closely, per order  - Develop a trusting relationship  - Supervise medication ingestion, monitor effects and side effects   Outcome: Progressing     Problem: Depression  Goal: Treatment Goal: Demonstrate behavioral control of depressive symptoms, verbalize feelings of improved mood/affect, and adopt new coping skills prior to discharge  Outcome: Progressing  Goal: Verbalize thoughts and feelings  Description: Interventions:  - Assess and re-assess patient's level of risk   - Engage patient in 1:1 interactions, daily, for a minimum of 15 minutes   - Encourage patient to express feelings, fears, frustrations, hopes   Outcome: Progressing  Goal: Refrain from isolation  Description: Interventions:  - Develop a trusting relationship   - Encourage socialization   Outcome: Progressing  Goal: Refrain from self-neglect  Outcome: Progressing     Problem: ANXIETY  Goal: Will report anxiety at manageable levels  Description: INTERVENTIONS:  - Administer medication as ordered  - Teach and encourage coping skills  - Provide emotional support  - Assess patient/family for anxiety and ability to cope  Outcome: Progressing  Goal: By discharge: Patient will verbalize 2 strategies to deal with anxiety  Description: Interventions:  - Identify any obvious source/trigger to anxiety  - Staff will assist patient in applying identified coping technique/skills  - Encourage attendance of scheduled groups and activities  Outcome: Progressing     Problem: SUBSTANCE USE/ABUSE  Goal: Will have no detox symptoms and will verbalize plan for changing substance-related behavior  Description: INTERVENTIONS:  - Monitor physical status and assess for symptoms of withdrawal  - Administer medication as ordered  - Provide emotional support with 1 on 1 interaction with staff  - Encourage recovery focused program/ addiction education  - Assess for verbalization of changing behaviors related to substance abuse  - Initiate consults and referrals as appropriate (Case Management, Spiritual Care, etc )  Outcome: Progressing  Goal: By discharge, will develop insight into their chemical dependency and sustain motivation to continue in recovery  Description: INTERVENTIONS:  - Attends all daily group sessions and scheduled AA groups  - Actively practices coping skills through participation in the therapeutic community and adherence to program rules  - Reviews and completes assignments from individual treatment plan  - Assist patient development of understanding of their personal cycle of addiction and relapse triggers  Outcome: Progressing  Goal: By discharge, patient will have ongoing treatment plan addressing chemical dependency  Description: INTERVENTIONS:  - Assist patient with resources and/or appointments for ongoing recovery based living  Outcome: Progressing     Problem: DISCHARGE PLANNING - CARE MANAGEMENT  Goal: Discharge to post-acute care or home with appropriate resources  Description: INTERVENTIONS:  - Conduct assessment to determine patient/family and health care team treatment goals, and need for post-acute services based on payer coverage, community resources, and patient preferences, and barriers to discharge  - Address psychosocial, clinical, and financial barriers to discharge as identified in assessment in conjunction with the patient/family and health care team  - Arrange appropriate level of post-acute services according to patients   needs and preference and payer coverage in collaboration with the physician and health care team  - Communicate with and update the patient/family, physician, and health care team regarding progress on the discharge plan  - Arrange appropriate transportation to post-acute venues  Outcome: Progressing

## 2022-10-05 NOTE — PROGRESS NOTES
10/05/22    Team Meeting   Meeting Type Daily Rounds   Team Members Present   Team Members Present Physician;Nurse;   Physician Team Member Dr Yovanny Scanlon MD; Dr Isac Goodman, DO; HOSP DR ELIE FLETCHER[address]   Nursing Team Member Jana Johns, HEATHER   Social Work Team Member Burke Garzon [place]   Patient/Family Present   Patient Present No   Patient's Family Present No     Agreeable to IP rehab, self harm hx but safe on unit, irritable edge, minimizing ETOH use

## 2022-10-05 NOTE — PROGRESS NOTES
10/05/22 3106   Activity/Group Checklist   Group   (Morning Community Meeting with Goal Setting)   Attendance Attended   Attendance Duration (min) 46-60   Interactions Interacted appropriately   Affect/Mood Appropriate;Bright;Calm   Goals Achieved Identified feelings; Discussed coping strategies; Able to listen to others; Able to engage in interactions; Able to reflect/comment on own behavior;Able to manage/cope with feelings

## 2022-10-05 NOTE — NURSING NOTE
Patient resting comfortable without distress non labored breathing noted  Unable to do CIWA at this time

## 2022-10-05 NOTE — PROGRESS NOTES
Progress Note - Behavioral Health     Radha Azul 29 y o  female MRN: [id number]   Unit/Bed#: U 247-01 Encounter: [id number]    Behavior over the last 24 hours: improving  Radha seen today  Per staff, pt has been visible on unit and seen socializing more with peers  She is calm and pleasant with staff  No irritability or aggression seen  She denies SI and is able to contract for safety on the unit  She has continued attending group with appropriate participation  Patient seen today  She is pleasant, cooperative, and more forthcoming today  She is more optimistic and brighter today and reports improved mood  She reports feeling more motivated and hopeful today, but states concern that this may be a temporary or false feeling  She states 1:1 therapeutic talk with provider yesterday was helpful and encouraged her to create a brief list of goals today  She appears to have good insight about her strengths and weaknesses and good judgement regarding further plans to continue improvement  She remains agreeable to discharge to inpatient drug and alcohol rehab to address her alcoholism and allow her to remain stable and compliant with medications  She states plan to eventually begin trauma therapy in the near future  She reports decreased feelings of depression  She denies any SI/HI or thoughts of self-harm  She does not appear to be a danger to herself or others  She reports decrease of hyperarousal or hyperawareness of surroundings  She states she is happy with her current medication and agrees to remain compliant  Good insight and judgement       Sleep: normal, vivid dreams  Appetite: fair, improving, reports improved eating habits  Medication side effects: Denies    Mental Status Evaluation:    Appearance:  age appropriate, casually dressed, dressed in hospital attire, underweight, thin, shaved head, tattoos   Behavior:  pleasant, cooperative, calm, good eye contact, forthcoming   Speech:  average rate and volume   Mood:  improved, euthymic, "better"   Affect:  brighter, more appropriate   Thought Process:  logical, goal directed, linear   Associations: intact associations   Thought Content:  no overt delusions   Perceptual Disturbances: no auditory hallucinations, no visual hallucinations   Risk Potential: Suicidal ideation - None at present, contracts for safety on the unit  Homicidal ideation - None   Sensorium:  oriented to person, place and time/date         Memory:  recent and remote memory grossly intact      Consciousness:  alert and awake      Attention: attention span and concentration are age appropriate      Insight:  good      Judgment: good      Gait/Station: normal gait/station      Motor Activity: no abnormal movements     Vital signs in last 24 hours:    Temp:  [97 6 °F (36 4 °C)-98 °F (36 7 °C)] 97 6 °F (36 4 °C)  HR:  [76-91] 85  Resp:  [16-18] 18  BP: (105-110)/(77-84) 109/77    Laboratory results:   I have personally reviewed all pertinent laboratory/tests results    Most Recent Labs:   Lab Results   Component Value Date    WBC 5 56 10/01/2022    RBC 3 78 (L) 10/01/2022    HGB 12 3 10/01/2022    HCT 35 4 10/01/2022     10/01/2022    RDW 13 2 10/01/2022    NEUTROABS 3 27 10/01/2022    SODIUM 147 10/01/2022    K 4 4 10/01/2022     10/01/2022    CO2 29 10/01/2022    BUN 12 10/01/2022    CREATININE 0 62 10/01/2022    GLUC 94 10/01/2022    CALCIUM 8 7 10/01/2022    AST 23 10/01/2022    ALT 11 10/01/2022    ALKPHOS 53 10/01/2022    TP 6 9 10/01/2022    ALB 4 1 10/01/2022    TBILI 0 21 10/01/2022    CHOLESTEROL 209 (H) 10/04/2022    HDL 62 10/04/2022    TRIG 134 10/04/2022    LDLCALC 120 (H) 10/04/2022    NONHDLC 147 10/04/2022    AMMONIA 29 10/01/2022    QYG5MSXDVGTU 1 418 10/04/2022    PREGSERUM Negative 10/01/2022    HGBA1C 5 2 10/04/2022     10/04/2022         Assessment/Plan   Principal Problem:    Severe episode of recurrent major depressive disorder, without psychotic features [facility])  Active Problems:    Borderline personality disorder ([address] )    Eating disorder    Alcohol abuse    Plan:  Increase Prozac 40 mg  Discontinue Remeron  Continue Prazosin 1 mg QHS for nightmares and PTSD  Continue Cymbalta 90 mg for ocular migraines  Continue Melatonin 10 mg  Patient anticipated for discharge when referral is accepted by inpatient rehab      All current active medications have been reviewed  Encourage group therapy, milieu therapy and occupational therapy  Continue treatment with group therapy, milieu therapy and occupational therapy  Behavioral Health checks every 7 minutes  Current Facility-Administered Medications   Medication Dose Route Frequency Provider Last Rate    acetaminophen  650 mg Oral Q6H PRN Carmen Bones Medei, CRNP      acetaminophen  650 mg Oral Q4H PRN Carmen Bones Medei, CRNP      acetaminophen  975 mg Oral Q6H PRN Carmen Bones Medei, CRNP      aluminum-magnesium hydroxide-simethicone  30 mL Oral Q4H PRN LILLIAN Knowles      benzonatate  100 mg Oral TID PRN Akanksha Leonard PA-C      haloperidol lactate  2 5 mg Intramuscular Q6H PRN Max 4/day Sharon M Medei, CRNP      And    LORazepam  1 mg Intramuscular Q6H PRN Max 4/day Carmen Bones Medei, CRNP      And    benztropine  0 5 mg Intramuscular Q6H PRN Max 4/day Carmen Bones Medei, CRNP      haloperidol lactate  5 mg Intramuscular Q4H PRN Max 4/day Carmen Bones Medei, CRNP      And    LORazepam  2 mg Intramuscular Q4H PRN Max 4/day Carmen Bones Medei, CRNP      And    benztropine  1 mg Intramuscular Q4H PRN Max 4/day Carmen Bones Medei, CRNP      benztropine  1 mg Oral Q6H PRN Carmen Bones Medei, CRNP      cyanocobalamin  1,000 mcg Intramuscular Q30 Days Akanksha Leonard PA-C      DULoxetine  90 mg Oral Daily Risa Medina DO      [START ON 10/6/2022] FLUoxetine  40 mg Oral Daily Rowan Veliz DO      gabapentin  100 mg Oral TID Carmen Bones Medei, CRNP      haloperidol  2 mg Oral Q4H PRN Max 6/day Carmen Bones Medei, CRNP      haloperidol  5 mg Oral Q6H PRN Max 4/day Dimple Petties Medei, CRNP      haloperidol  5 mg Oral Q4H PRN Max 4/day Dimple Petties Medei, CRNP      hydrOXYzine HCL  25 mg Oral Q6H PRN Max 4/day Dimple Petties Medei, CRNP      hydrOXYzine HCL  50 mg Oral Q4H PRN Max 4/day Dimple Petties Medei, CRNP      Or    LORazepam  1 mg Intramuscular Q4H PRN Dimple Petties Medei, CRNP      LORazepam  1 mg Oral Q4H PRN Max 6/day Dimple Petties Medei, CRNP      Or    LORazepam  2 mg Intramuscular Q6H PRN Max 3/day Dimple Petties Medei, CRNP      melatonin  10 5 mg Oral HS Merry Brantley DO      multivitamin stress formula  1 tablet Oral Daily Sharon M Medei, CRNP      nicotine  1 patch Transdermal Daily Akanksha L Dagnall, PA-C      nicotine polacrilex  4 mg Oral Q2H PRN Akanksha L Dagnall, PA-C      polyethylene glycol  17 g Oral Daily PRN Dimple Petties Medei, CRNP      prazosin  1 mg Oral HS Rowan Veliz DO      SUMAtriptan  25 mg Oral Daily PRN Akanksha L Dagnall, PA-C      thiamine  100 mg Oral Daily Sharon M Medei, CRNP      traZODone  100 mg Oral HS PRN Dimple Petties Medei, CRNP         Risks / Benefits of Treatment:    Risks, benefits, and possible side effects of medications explained to patient and patient verbalizes understanding and agreement for treatment  Counseling / Coordination of Care: Total floor / unit time spent today 25 minutes  Greater than 50% of total time was spent with the patient and / or family counseling and / or coordination of care  A description of counseling / coordination of care:  Patient's progress discussed with staff in treatment team meeting  Medications, treatment progress and treatment plan reviewed with patient      Merry Brantley DO 10/05/22

## 2022-10-05 NOTE — PROGRESS NOTES
Progress Note - Radha Azul 29 y o  female MRN: [id number]    Unit/Bed#: [facility] 247-01 Encounter: [id number]        Subjective:   Patient seen and examined at bedside after reviewing the chart and discussing the case with the caring staff  Patient has no acute concerns  Physical Exam   Vitals: Blood pressure 109/77, pulse 85, temperature 97 6 °F (36 4 °C), temperature source Temporal, resp  rate 18, height 5' 2" (1 575 m), weight 45 1 kg (99 lb 6 4 oz), SpO2 99 %  ,Body mass index is 18 18 kg/m²  Constitutional: Patient in no acute distress  HEENT: PERR, EOMI  Pulmonary/Chest: No respiratory distress  Abdomen: Non distended  Neuro: Awake, alert, moving all extremities, gait intact  Assessment/Plan:  Ramy Suarez is a(n) 29 y o  female with MDD      1  Alcohol abuse  CIWA protocol  Patient is on thiamine, folic acid, multivitamin  2  Tobacco use  Patient is on nicotine transdermal patch 21 mg/24 hr, nicotine gum as needed  3  Ocular migraines  Continue Cymbalta 90 mg daily, sumatriptan 25 mg as needed  4  Cough  Patient may take Tessalon Perles and use cough drops as needed  CXR on 10/01/2022 negative  5  Vitamin B12 deficiency  Patient started on monthly vitamin B12 injections  The patient was discussed with Dr Denia Plummer and he is in agreement with the above note

## 2022-10-05 NOTE — NURSING NOTE
Patient visible on unit socializing with peers in the dining room  Patient is pleasant, calm, and cooperative and is medication compliant  Patient has good insight regarding her strengths and weaknesses  She states she would like to do a 30 day inpatient rehab followed by a partial program to help keep her accountable with medication compliance  Patient denies SI/HI/AVH and does not endorse anxiety or depression  Continuous visual safety checks performed throughout the shift  All safety precautions maintained

## 2022-10-05 NOTE — PROGRESS NOTES
10/05/22 1000   Activity/Group Checklist   Group   (Self Reflection and Discovery Art Therapy)   Attendance Attended   Attendance Duration (min) Greater than 60   Interactions Interacted appropriately   Affect/Mood Appropriate;Bright;Calm   Goals Achieved Identified feelings; Identified triggers; Identified relapse prevention strategies; Discussed coping strategies; Identified resources and support systems; Able to listen to others; Able to engage in interactions; Able to reflect/comment on own behavior;Able to manage/cope with feelings

## 2022-10-05 NOTE — NURSING NOTE
Patient visible on unit quiet and remains to self  Patient has good eye contact speech calm with normal volumn  No irritability or agression  Thought process linear  Med compliant and did not utilized any PRNs  Patient is minimal with nursing regarding reason for admission  Patient did report to SW that   She states post-discharge plan to go to 30 days of inpatient rehab to address her alcoholism and to help her med compliance Patient denies SI HI AVH and anxiety endorsed depression  Pt is med compliant  New meds started prozac 20mg aND PLAN TO TAPER HER OFF REMERON   pRAZOSIN 1MG STARTED AT hs     Q 7 min safety checks maintained

## 2022-10-06 PROCEDURE — 99232 SBSQ HOSP IP/OBS MODERATE 35: CPT | Performed by: PSYCHIATRY & NEUROLOGY

## 2022-10-06 RX ORDER — SORBITOL SOLUTION 70 %
30 SOLUTION, ORAL MISCELLANEOUS
Status: DISCONTINUED | OUTPATIENT
Start: 2022-10-06 | End: 2022-10-07 | Stop reason: HOSPADM

## 2022-10-06 RX ORDER — POLYETHYLENE GLYCOL 3350 17 G/17G
17 POWDER, FOR SOLUTION ORAL DAILY
Status: DISCONTINUED | OUTPATIENT
Start: 2022-10-06 | End: 2022-10-07 | Stop reason: HOSPADM

## 2022-10-06 RX ADMIN — NICOTINE 1 PATCH: 21 PATCH, EXTENDED RELEASE TRANSDERMAL at 08:45

## 2022-10-06 RX ADMIN — GABAPENTIN 100 MG: 100 CAPSULE ORAL at 20:39

## 2022-10-06 RX ADMIN — DULOXETINE 90 MG: 60 CAPSULE, DELAYED RELEASE ORAL at 08:57

## 2022-10-06 RX ADMIN — NICOTINE POLACRILEX 4 MG: 4 GUM, CHEWING BUCCAL at 13:14

## 2022-10-06 RX ADMIN — GABAPENTIN 100 MG: 100 CAPSULE ORAL at 17:15

## 2022-10-06 RX ADMIN — POLYETHYLENE GLYCOL 3350 17 G: 17 POWDER, FOR SOLUTION ORAL at 13:15

## 2022-10-06 RX ADMIN — SORBITOL SOLUTION (BULK) 30 ML: 70 SOLUTION at 18:18

## 2022-10-06 RX ADMIN — GABAPENTIN 100 MG: 100 CAPSULE ORAL at 08:57

## 2022-10-06 RX ADMIN — B-COMPLEX W/ C & FOLIC ACID TAB 1 TABLET: TAB at 08:44

## 2022-10-06 RX ADMIN — FLUOXETINE 40 MG: 20 CAPSULE ORAL at 08:57

## 2022-10-06 RX ADMIN — SORBITOL SOLUTION (BULK) 30 ML: 70 SOLUTION at 20:39

## 2022-10-06 RX ADMIN — Medication 10.5 MG: at 20:40

## 2022-10-06 RX ADMIN — THIAMINE HCL TAB 100 MG 100 MG: 100 TAB at 08:44

## 2022-10-06 RX ADMIN — PRAZOSIN HYDROCHLORIDE 1 MG: 1 CAPSULE ORAL at 20:39

## 2022-10-06 NOTE — NURSING NOTE
Patient was pleasant and cooperative  Patient social with staff and peers  Staff support provided  Q 7 minute safety checks maintained  Patient denied SI/HI  Patient told staff she is feeling better  Patient compliant with medications and groups  Staff will continue to monitor and support

## 2022-10-06 NOTE — PROGRESS NOTES
Progress Note - Behavioral Health     Radha Azul 29 y o  female MRN: [id number]   Unit/Bed#: [facility] 247-01 Encounter: [id number]    Behavior over the last 24 hours: improving  Radha seen today  Per staff, pt has been pleasant and cooperative on unit and socializing with peers  No negative behaviors seen  Remains able to contract for safety on the unit  She has continued attending group with appropriate participation  Patient seen today  She is pleasant, cooperative, and forthcoming  She remains bright and optimistic with improved mood  She remains hopeful and motivated  She states excitement to continue her self-improvement with transfer to inpatient drug and alcohol rehab, continuing her medications, and eventually beginning trauma therapy  She denies any feelings of depression or anxiety  She denies any SI/HI or thoughts of self-harm  She does not appear a danger to herself or others  She reports decrease of hyperarousal or hyperawareness of surroundings  She remains happy with her current medication and agrees to remain compliant  Good insight and judgement       Sleep: normal, vivid dreams  Appetite: normal, reports improved eating habits  Medication side effects: Constipation    Mental Status Evaluation:    Appearance:  age appropriate, casually dressed, dressed in hospital attire, underweight, thin, shaved head, tattoos   Behavior:  pleasant, cooperative, calm, good eye contact, forthcoming   Speech:  average rate and volume   Mood:  improved, euthymic, "Good"   Affect:  appropriate, full, brighter   Thought Process:  logical, goal directed, linear   Associations: intact associations   Thought Content:  no overt delusions   Perceptual Disturbances: no auditory hallucinations, no visual hallucinations   Risk Potential: Suicidal ideation - None at present, contracts for safety on the unit  Homicidal ideation - None   Sensorium:  oriented to person, place and time/date         Memory:  recent and remote memory grossly intact      Consciousness:  alert and awake      Attention: attention span and concentration are age appropriate      Insight:  good      Judgment: good      Gait/Station: normal gait/station      Motor Activity: no abnormal movements     Vital signs in last 24 hours:    Temp:  [97 6 °F (36 4 °C)-97 8 °F (36 6 °C)] 97 8 °F (36 6 °C)  HR:  [84-96] 96  Resp:  [16-18] 18  BP: ()/(70-77) 92/72    Laboratory results:   I have personally reviewed all pertinent laboratory/tests results  Most Recent Labs:   Lab Results   Component Value Date    WBC 5 56 10/01/2022    RBC 3 78 (L) 10/01/2022    HGB 12 3 10/01/2022    HCT 35 4 10/01/2022     10/01/2022    RDW 13 2 10/01/2022    NEUTROABS 3 27 10/01/2022    SODIUM 147 10/01/2022    K 4 4 10/01/2022     10/01/2022    CO2 29 10/01/2022    BUN 12 10/01/2022    CREATININE 0 62 10/01/2022    GLUC 94 10/01/2022    CALCIUM 8 7 10/01/2022    AST 23 10/01/2022    ALT 11 10/01/2022    ALKPHOS 53 10/01/2022    TP 6 9 10/01/2022    ALB 4 1 10/01/2022    TBILI 0 21 10/01/2022    CHOLESTEROL 209 (H) 10/04/2022    HDL 62 10/04/2022    TRIG 134 10/04/2022    LDLCALC 120 (H) 10/04/2022    NONHDLC 147 10/04/2022    AMMONIA 29 10/01/2022    FKJ3KLXCCGLY 1 418 10/04/2022    PREGSERUM Negative 10/01/2022    HGBA1C 5 2 10/04/2022     10/04/2022         Assessment/Plan   Principal Problem:    Severe episode of recurrent major depressive disorder, without psychotic features ([facility] )  Active Problems:    Borderline personality disorder ([facility] )    Alcohol abuse    Plan:  Continue Prozac 40 mg  Continue Cymbalta 90 mg  Continue Prazosin 1 mg QHS for nightmares and PTSD  Continue Melatonin 10 mg  Patient anticipated for discharge when referral is accepted by inpatient rehab      All current active medications have been reviewed  Encourage group therapy, milieu therapy and occupational therapy  Continue treatment with group therapy, milieu therapy and occupational therapy  Behavioral Health checks every 7 minutes  Current Facility-Administered Medications   Medication Dose Route Frequency Provider Last Rate    acetaminophen  650 mg Oral Q6H PRN Mare Decent Medei, CRNP      acetaminophen  650 mg Oral Q4H PRN Mare Decent Medei, CRNP      acetaminophen  975 mg Oral Q6H PRN Mare Decent Medei, CRNP      aluminum-magnesium hydroxide-simethicone  30 mL Oral Q4H PRN Louise Hazard, CRNP      benzonatate  100 mg Oral TID PRN Akanksha Leonard PA-C      haloperidol lactate  2 5 mg Intramuscular Q6H PRN Max 4/day Sharon M Medei, CRNP      And    LORazepam  1 mg Intramuscular Q6H PRN Max 4/day Mare Decent Medei, CRNP      And    benztropine  0 5 mg Intramuscular Q6H PRN Max 4/day Mare Decent Medei, CRNP      haloperidol lactate  5 mg Intramuscular Q4H PRN Max 4/day Mare Decent Medei, CRNP      And    LORazepam  2 mg Intramuscular Q4H PRN Max 4/day Mare Decent Medei, CRNP      And    benztropine  1 mg Intramuscular Q4H PRN Max 4/day Mare Decent Medei, CRNP      benztropine  1 mg Oral Q6H PRN Mare Decent Medei, CRNP      cyanocobalamin  1,000 mcg Intramuscular Q30 Days Akanksha Leonard PA-C      DULoxetine  90 mg Oral Daily Rowan Kalaga, DO      FLUoxetine  40 mg Oral Daily Rowan Kalaga, DO      gabapentin  100 mg Oral TID Mare Decent Medei, CRNP      haloperidol  2 mg Oral Q4H PRN Max 6/day Mare Decent Medei, CRNP      haloperidol  5 mg Oral Q6H PRN Max 4/day Mare Decent Medei, CRNP      haloperidol  5 mg Oral Q4H PRN Max 4/day Mare Decent Medei, CRNP      hydrOXYzine HCL  25 mg Oral Q6H PRN Max 4/day Mare Decent Medei, CRNP      hydrOXYzine HCL  50 mg Oral Q4H PRN Max 4/day Mare Decent Medei, CRNP      Or    LORazepam  1 mg Intramuscular Q4H PRN Mare Decent Medei, CRNP      LORazepam  1 mg Oral Q4H PRN Max 6/day Mare Decent Medei, CRNP      Or    LORazepam  2 mg Intramuscular Q6H PRN Max 3/day Mare Gladis Medei, CRNP      melatonin  10 5 mg Oral HS Rowan Veliz DO      multivitamin stress formula  1 tablet Oral Daily Stanley Hoffman, LILLIAN      nicotine  1 patch Transdermal Daily Akanksha Leonard PA-C      nicotine polacrilex  4 mg Oral Q2H PRN Akanksha Leonard PA-C      polyethylene glycol  17 g Oral Daily PRN Stanley Schusterch Medei, LILLIAN      polyethylene glycol  17 g Oral Daily Rowan Kalaga, DO      prazosin  1 mg Oral HS Rowan Kalaga, DO      sorbitol  30 mL Oral Q1H PRN Akanksha Leonard PA-C      SUMAtriptan  25 mg Oral Daily PRN Akanksha Leonard PA-C      thiamine  100 mg Oral Daily SharonLILLIAN Thompson      traZODone  100 mg Oral HS PRN LILLIAN Sierra         Risks / Benefits of Treatment:    Risks, benefits, and possible side effects of medications explained to patient and patient verbalizes understanding and agreement for treatment  Counseling / Coordination of Care: Total floor / unit time spent today 25 minutes  Greater than 50% of total time was spent with the patient and / or family counseling and / or coordination of care  A description of counseling / coordination of care:  Patient's progress discussed with staff in treatment team meeting  Medications, treatment progress and treatment plan reviewed with patient      Zoraida DO Nissa 10/06/22

## 2022-10-06 NOTE — SOCIAL WORK
IP Rehab Bed Search; the following providers are in network with insurance  [facility]: Nurse Monica Bernabe has no availability at this time ([phone], [phone])    [facility] Sher stated they did not receive referral; faxed again to [phone]  Jhonny Moore stated [facility] is not contracted with pts insurance  [facility] [phone] [facility] states pts file is still under review  Shala Walls, no female bed at this time; program is concerned about ED dx and will not accept  [place]-pt completed intake with Lourdes Richter [phone]; pt declined due to ED dx

## 2022-10-06 NOTE — PROGRESS NOTES
Progress Note - Radha Azul 29 y o  female MRN: [id number]    Unit/Bed#: [facility] 247-01 Encounter: [id number]        Subjective:   Patient seen and examined at bedside after reviewing the chart and discussing the case with the caring staff  Patient reports not having bowel movement for the past 7 days  She normally has BM every 3 days  Denies any nausea, vomiting, abdominal pain  Physical Exam   Vitals: Blood pressure 94/70, pulse 88, temperature 97 6 °F (36 4 °C), temperature source Temporal, resp  rate 16, height 5' 2" (1 575 m), weight 45 1 kg (99 lb 6 4 oz), SpO2 97 %  ,Body mass index is 18 18 kg/m²  Constitutional: Patient in no acute distress  HEENT: PERR, EOMI  Cardiovascular:  Normal rate  Pulmonary/Chest: No respiratory distress  Abdomen:  Non distended, +BS, soft, NT, no guarding, no rigidity  Neuro: Awake, alert, moving all extremities, gait intact  Assessment/Plan:  Schuyler Gutierrez is a(n) 29 y o  female with MDD  1  Alcohol abuse  CIWA protocol  Patient is on thiamine, folic acid, multivitamin  2  Tobacco use  Patient is on nicotine transdermal patch 21 mg/24 hr, nicotine gum as needed  3  Ocular migraines  Continue Cymbalta 90 mg daily, sumatriptan 25 mg as needed  4  Cough  Patient may take Tessalon Perles and use cough drops as needed  CXR on 10/01/2022 negative  5  Vitamin B12 deficiency  Patient started on monthly vitamin B12 injections  6  Constipation  Patient was started on MiraLax daily x 3 days by psych team     The patient was discussed with Dr Jesús Taylor and he is in agreement with the above note

## 2022-10-06 NOTE — SOCIAL WORK
Sw faxed facesheet, insurance information, med list, two progress notes to IP rehab facility in network with pts insurance    [facility] [phone]  [facility] [phone]  [facility] [id number], [phone]  [facility] [place] [phone]

## 2022-10-06 NOTE — PROGRESS NOTES
10/06/22   Team Meeting   Meeting Type Daily Rounds   Team Members Present   Team Members Present Physician;Nurse;   Physician Team Member Dr Rabia Nixon MD; Dr Herminio Abbott, DO; HOSP DR ELIE FLETCHER, [address] [place] [address]   Nursing Team Member Stephany Dillon, RN   Social Work Team Member Mara Butler, [place]   Patient/Family Present   Patient Present No   Patient's Family Present No     IP rehab referral, pleasant, visible, calm, cooperative, minimizing ETOH, no behaviors, med comp, denies SI

## 2022-10-06 NOTE — NURSING NOTE
Patient visible on unit calm cooperative pleasant with staff and peers  No behavior issues, no irritability or aggression  Patient speaks clearly and logic  Helpful to other patients  Patient remains minimizing in regards to her alcohol use but states she wants IP rehab to help with her compliant w/meds  Patient denies SI HI AVH depression and anxiety  Q 7 min safety checks maintained  Increase in prozac to 40mg to begin 10/6 and jesús d/c'd      CIWA score 0

## 2022-10-06 NOTE — SOCIAL WORK
Melvi Vazquez [phone] has pt rehab referral and will review it today for possible IP  Jhon Gresham will call Sw back to schedule time to meet with pt later today

## 2022-10-06 NOTE — SOCIAL WORK
[address] [facility] Cat contacted regarding pts admission and inability to start employment [phone]

## 2022-10-07 VITALS
OXYGEN SATURATION: 100 % | RESPIRATION RATE: 16 BRPM | TEMPERATURE: 98.2 F | HEART RATE: 74 BPM | HEIGHT: 62 IN | SYSTOLIC BLOOD PRESSURE: 105 MMHG | WEIGHT: 99.4 LBS | BODY MASS INDEX: 18.29 KG/M2 | DIASTOLIC BLOOD PRESSURE: 77 MMHG

## 2022-10-07 PROCEDURE — 99238 HOSP IP/OBS DSCHRG MGMT 30/<: CPT | Performed by: HOSPITALIST

## 2022-10-07 RX ORDER — FLUOXETINE HYDROCHLORIDE 40 MG/1
40 CAPSULE ORAL DAILY
Qty: 30 CAPSULE | Refills: 0 | Status: SHIPPED | OUTPATIENT
Start: 2022-10-07 | End: 2022-11-06

## 2022-10-07 RX ORDER — DOCUSATE SODIUM 100 MG/1
100 CAPSULE, LIQUID FILLED ORAL DAILY
Status: DISCONTINUED | OUTPATIENT
Start: 2022-10-07 | End: 2022-10-07 | Stop reason: HOSPADM

## 2022-10-07 RX ORDER — PHENOL 1.4 %
10 AEROSOL, SPRAY (ML) MUCOUS MEMBRANE
Qty: 30 TABLET | Refills: 0 | Status: SHIPPED | OUTPATIENT
Start: 2022-10-07 | End: 2022-11-06

## 2022-10-07 RX ORDER — PRAZOSIN HYDROCHLORIDE 1 MG/1
1 CAPSULE ORAL
Qty: 30 CAPSULE | Refills: 0 | Status: SHIPPED | OUTPATIENT
Start: 2022-10-07 | End: 2022-11-06

## 2022-10-07 RX ORDER — DULOXETIN HYDROCHLORIDE 30 MG/1
90 CAPSULE, DELAYED RELEASE ORAL DAILY
Qty: 90 CAPSULE | Refills: 0 | Status: SHIPPED | OUTPATIENT
Start: 2022-10-07 | End: 2022-11-06

## 2022-10-07 RX ADMIN — GABAPENTIN 100 MG: 100 CAPSULE ORAL at 16:06

## 2022-10-07 RX ADMIN — DULOXETINE 90 MG: 60 CAPSULE, DELAYED RELEASE ORAL at 09:26

## 2022-10-07 RX ADMIN — THIAMINE HCL TAB 100 MG 100 MG: 100 TAB at 09:26

## 2022-10-07 RX ADMIN — FLUOXETINE 40 MG: 20 CAPSULE ORAL at 09:25

## 2022-10-07 RX ADMIN — NICOTINE 1 PATCH: 21 PATCH, EXTENDED RELEASE TRANSDERMAL at 09:25

## 2022-10-07 RX ADMIN — B-COMPLEX W/ C & FOLIC ACID TAB 1 TABLET: TAB at 09:25

## 2022-10-07 RX ADMIN — GABAPENTIN 100 MG: 100 CAPSULE ORAL at 09:25

## 2022-10-07 NOTE — SOCIAL WORK
Pt completed [facility] intake interview at [phone]; pt agreeable to bed accepted at [facility] location   [facility] to provide transport today;  will call nurses station once arrived around 2pm

## 2022-10-07 NOTE — BH TRANSITION RECORD
Contact Information: If you have any questions, concerns, pended studies, tests and/or procedures, or emergencies regarding your inpatient behavioral health visit  Please contact [facility] behavioral health unit [phone] and ask to speak to a , nurse or physician  A contact is available 24 hours/ 7 days a week at this number  Summary of Procedures Performed During your Stay:  Below is a list of major procedures performed during your hospital stay and a summary of results:  - No major procedures performed  Pending Studies (From admission, onward)    None        Please follow up on the above pending studies with your PCP and/or referring provider

## 2022-10-07 NOTE — NURSING NOTE
Pt is being discharged with the following items:    Underwear x3  Sports bra x  Jeans x2  Black jacket x1  Yellow cardigan x1  Bookbag  Toiletries  Sandals  Boots  Red jacket  Purse w/ misc items  Box w/ misc items and laptop  3 suitcases w/ misc items    Contraband:  Headphones  Cell phone  Laptop   vape w/ vape juice  Phone chargers x2  Keys  Passport    Security:  Wallet w/ SS card, PA ID, insurance card, debit cards x5  Watch  Necklace    Pt reviewed and signed belongings checklist

## 2022-10-07 NOTE — PROGRESS NOTES
10/07/22   Team Meeting   Meeting Type Daily Rounds   Team Members Present   Team Members Present Physician;Nurse;   Physician Team Member Dr Sandra Ng MD; Dr Shabana Salinas, DO; HOSP DR ELIE FLETCHER[address]   Nursing Team Member Milady Nevarez, RN   [facility]   Patient/Family Present   Patient Present No   Patient's Family Present No     D/c today to [facility] at 1pm for rehab

## 2022-10-07 NOTE — NURSING NOTE
Patient visible on unit without any behavioral issues  Pleasant and cooperative very social and helpful with peers  +MMG With optimistic outlook   Referrals sent in regards to IP rehab  No prns utilizedPatient denies all SI HI AVH depression and anxiety  Q 7 min safety checks maintained

## 2022-10-07 NOTE — PROGRESS NOTES
Progress Note - Radha Azul 29 y o  female MRN: [id number]    Unit/Bed#: [facility] 247-01 Encounter: [id number]        Subjective:   Patient seen and examined at bedside after reviewing the chart and discussing the case with the caring staff  Patient reports still not having bowel movement  Is trying sorbitol  Denies any nausea, vomiting, abdominal pain  Physical Exam   Vitals: Blood pressure 97/66, pulse 67, temperature (!) 97 2 °F (36 2 °C), temperature source Temporal, resp  rate 16, height 5' 2" (1 575 m), weight 45 1 kg (99 lb 6 4 oz), SpO2 99 %  ,Body mass index is 18 18 kg/m²  Constitutional: Patient in no acute distress  HEENT: PERR, EOMI  Cardiovascular:  Normal rate  Pulmonary/Chest: No respiratory distress  Abdomen:  Non distended, +BS, soft, NT  Neuro: Awake, alert, moving all extremities, gait intact  Assessment/Plan:  Destini Hernandez is a(n) 29 y o  female with MDD  1  Alcohol abuse  CIWA protocol  Patient is on thiamine, folic acid, multivitamin  2  Tobacco use  Patient is on nicotine transdermal patch 21 mg/24 hr, nicotine gum as needed  3  Ocular migraines  Continue Cymbalta 90 mg daily, sumatriptan 25 mg as needed  4  Cough  Patient may take Tessalon Perles and use cough drops as needed  CXR on 10/01/2022 negative  5  Vitamin B12 deficiency  Patient started on monthly vitamin B12 injections  6  Constipation  Patient was started on MiraLax daily x 3 days, sorbitol q1h as needed until BM  The patient was discussed with Dr Tanmay Lucero and he is in agreement with the above note

## 2022-10-07 NOTE — NURSING NOTE
Pt ride from [facility] arrived  Escorted off unit by T to [facility] transport  Pt's belongings returned and pt signed  Denies SI, HI, AVH at time of discharge

## 2022-10-07 NOTE — DISCHARGE SUMMARY
Discharge Summary - Ana Richards 1527 29 y o  female MRN: [id number]  Unit/Bed#: [facility] 247-01 Encounter: [id number]     Admission Date: 10/3/2022         Discharge Date: No discharge date for patient encounter  Attending Psychiatrist: John Escamilla MD    Reason for Admission/HPI: Major depressive disorder [F32 9]    Patient is a 29 y o  female with a past history of Depression, Anxiety, PTSD, Eating Disorder, Borderline Personality Disorder, and Alcohol Use Disorder who presented due to worsening depression and suicidal ideation with plans  Hospital Course: On October 1st, patient was brought by EMS to [address] [place] [address] ED from [facility] where patient was found unresponsive on [facility] lobby couch  Reportedly, patient displayed altered mental status and labs revealed Ethanol level of 392  Pt endorsed SI when evaluated by crisis  After evaluation in the ED, the patient was admitted on a voluntary 201 commitment to the inpatient psychiatric unit  Patient was started on every 7 minutes precautions  During the hospitalization the patient was attending individual therapy, group therapy, milieu therapy and occupational therapy  To address depressive symptoms and thoughts of self-harm the patient was started on antidepressant Prozac and continued on Cymbalta  To address nightmares and PTSD symptoms the patient was started on Prazosin  Psychiatric medication doses were titrated over the hospital stay  Prior to beginning of treatment medications risks and benefits and possible side effects including risk of suicidality and serotonin syndrome related to treatment with antidepressants were reviewed with the patient  The patient verbalized understanding and agreement for treatment  Patient's symptoms gradually improved over the hospital course  At the end of treatment the patient was doing well and mood was stable   The patient denied suicidal ideation, intent or plan and denied homicidal ideation, intent or plan at the time of discharge  There was no overt psychosis at the time of discharge  Sleep and appetite were improved  The patient was tolerating medications and was not reporting any significant side effects at the time of discharge  Since she was doing well at the end of the hospitalization, treatment team felt that she could be safely discharged to outpatient care  The transfer to [place] [address] for inpatient drug and alcohol rehab was arranged by the unit  upon discharge  Patient discharged on Prozac 40 mg daily, Cymbalta 90 mg daily, Prazosin 1 mg QHS, and Melatonin 10 5 mg QHS  Mental Status at time of Discharge:     Appearance:  age appropriate, casually dressed and tattooed, shaved head, young thin female   Behavior:  pleasant, calm, cooperative   Speech:  average rate and volume   Mood:  Stable   Affect:  full, appropriate   Thought Process:  linear, logical   Thought Content:  no overt delusions, no paranoid ideations   Perceptual Disturbances: None   Risk Potential: None at present   Sensorium:  person, place, time/date, and situation   Cognition:  recent and remote memory grossly intact   Consciousness:  alert and awake    Attention: attention span and concentration were age appropriate   Insight:  good   Judgment: good   Gait/Station: normal gait/station   Motor Activity: no abnormal movements     Admission Diagnosis:Major depressive disorder [F32 9]    Discharge Diagnosis:   Principal Problem:    Severe episode of recurrent major depressive disorder, without psychotic features ([facility] )  Active Problems:    Borderline personality disorder ([facility] )    Alcohol abuse  Resolved Problems:    * No resolved hospital problems   *      Lab results:  Admission on 10/03/2022   Component Date Value    TSH 3RD GENERATON 10/04/2022 1 418     Cholesterol 10/04/2022 209 (A)    Triglycerides 10/04/2022 134     HDL, Direct 10/04/2022 62     LDL Calculated 10/04/2022 120 (A)    Non-HDL-Chol (CHOL-HDL) 10/04/2022 147     Hemoglobin A1C 10/04/2022 5 2     EAG 10/04/2022 103     Folate 10/04/2022 >20 0 (A)    Vit D, 25-Hydroxy 10/04/2022 40 0     Vitamin B-12 10/04/2022 264     Ventricular Rate 10/03/2022 68     Atrial Rate 10/03/2022 68     AL Interval 10/03/2022 136     QRSD Interval 10/03/2022 76     QT Interval 10/03/2022 404     QTC Interval 10/03/2022 429     P Axis 10/03/2022 61     QRS Axis 10/03/2022 87     T Wave Axis 10/03/2022 64     Ventricular Rate 10/03/2022 68     Atrial Rate 10/03/2022 68     AL Interval 10/03/2022 134     QRSD Interval 10/03/2022 78     QT Interval 10/03/2022 404     QTC Interval 10/03/2022 429     P Axis 10/03/2022 58     QRS Axis 10/03/2022 86     T Wave Axis 10/03/2022 63        Discharge Medications:  Current Discharge Medication List      START taking these medications    Details   FLUoxetine (PROzac) 40 MG capsule Take 1 capsule (40 mg total) by mouth daily  Qty: 30 capsule, Refills: 0    Associated Diagnoses: Severe episode of recurrent major depressive disorder, without psychotic features ([facility])      melatonin 10 MG TABS Take 1 tablet (10 mg total) by mouth daily at bedtime  Qty: 30 tablet, Refills: 0    Associated Diagnoses: Insomnia      prazosin (MINIPRESS) 1 mg capsule Take 1 capsule (1 mg total) by mouth daily at bedtime  Qty: 30 capsule, Refills: 0    Associated Diagnoses: PTSD (post-traumatic stress disorder)            Current Discharge Medication List      STOP taking these medications       mirtazapine (REMERON) 30 mg tablet Comments:   Reason for Stopping:              Current Discharge Medication List      CONTINUE these medications which have CHANGED    Details   DULoxetine (CYMBALTA) 30 mg delayed release capsule Take 3 capsules (90 mg total) by mouth daily  Qty: 90 capsule, Refills: 0    Associated Diagnoses: Severe episode of recurrent major depressive disorder, without psychotic features ([facility] )            Current Discharge Medication List           Discharge instructions/Information to patient and family:   Discharge to inpatient drug and alcohol rehabilitation program at [facility]  See after visit summary for information provided to patient and family  Provisions for Follow-Up Care:  See after visit summary for information related to follow-up care and any pertinent home health orders  Discharge Statement   I spent 20 minutes discharging the patient  This time was spent on the day of discharge  I had direct contact with the patient on the day of discharge  Additional documentation is required if more than 30 minutes were spent on discharge

## 2022-10-07 NOTE — SOCIAL WORK
Nursing notified of pt's transport to [facility] being delayed to 5pm; sw notified pt who was understanding

## 2022-10-07 NOTE — SOCIAL WORK
Sw left  for Mikey Mock [phone] ext [phone] relaying contact number for pts insurance Ayo Castañeda [phone])  Pt provided print out info on [facility] program and location address/phone number

## 2022-10-07 NOTE — PROGRESS NOTES
10/07/22 0800   Activity/Group Checklist   Group Community meeting   Attendance Attended   Attendance Duration (min) 16-30   Interactions Interacted appropriately   Affect/Mood Appropriate   Goals Achieved Identified feelings; Able to listen to others; Able to engage in interactions; Able to self-disclose; Able to recieve feedback
